# Patient Record
Sex: MALE | Race: WHITE | NOT HISPANIC OR LATINO | Employment: OTHER | URBAN - METROPOLITAN AREA
[De-identification: names, ages, dates, MRNs, and addresses within clinical notes are randomized per-mention and may not be internally consistent; named-entity substitution may affect disease eponyms.]

---

## 2017-03-08 ENCOUNTER — OFFICE VISIT (OUTPATIENT)
Dept: CARDIOLOGY | Facility: CLINIC | Age: 72
End: 2017-03-08

## 2017-03-08 VITALS
HEIGHT: 67 IN | HEART RATE: 90 BPM | DIASTOLIC BLOOD PRESSURE: 88 MMHG | SYSTOLIC BLOOD PRESSURE: 144 MMHG | WEIGHT: 186.2 LBS | BODY MASS INDEX: 29.22 KG/M2

## 2017-03-08 DIAGNOSIS — R00.1 BRADYCARDIA: ICD-10-CM

## 2017-03-08 DIAGNOSIS — I47.1 SVT (SUPRAVENTRICULAR TACHYCARDIA) (HCC): Primary | ICD-10-CM

## 2017-03-08 DIAGNOSIS — D50.0 IRON DEFICIENCY ANEMIA DUE TO CHRONIC BLOOD LOSS: ICD-10-CM

## 2017-03-08 PROCEDURE — 99213 OFFICE O/P EST LOW 20 MIN: CPT | Performed by: INTERNAL MEDICINE

## 2017-03-08 PROCEDURE — 93288 INTERROG EVL PM/LDLS PM IP: CPT | Performed by: INTERNAL MEDICINE

## 2017-03-08 RX ORDER — FERROUS SULFATE 325(65) MG
325 TABLET ORAL
COMMUNITY
End: 2019-07-24

## 2017-03-08 NOTE — PROGRESS NOTES
Tobias Still  1945  [phone]  [phone]    03/08/2017    [facility] CARDIOLOGY     Kayode Aldrich MD  [address] [place] [id number]    Chief Complaint   Patient presents with   • Atrial Fibrillation   • Slow Heart Rate       Problem List:   PROBLEM LIST:  1. Complete heart block/bradycardia:  a. Implantation of a dual-chamber permanent pacemaker (Medtronic), 01/19/2009.  2. Atrial fibrillation/MAT/atrial flutter:  a. Onset in 1997, treated with Tambocor, which was changed to amiodarone, Rythmol, then back to Tambocor therapy.  b. History of Corgard, atenolol, Sectral, verapamil, and Calan use, intolerant secondary to fatigue.  c. Stress echo/echo 1 year ago at [facility], no data.  d. Echocardiogram, 03/23/2010, normal LV size and function.  e. Echocardiogram, October 2014, left ventricular ejection fraction 55% to 60%, mild mitral regurgitation, mild tricuspid regurgitation.   3. Shortness of breath:  a. GXT, 06/26/2015: normal LV size and function, no ischemia.  b. Found to have significant anemia with an hematocrit of 27.  c. EGD with subsequent cauterization of an ulcer, database incomplete.  4. Chronic back pain.  5. Surgical history:  a. Left ankle.  b. Left wrist.  c. Right shoulder.  d. Right wrist.    Allergies  No Known Allergies    Current Medications    Current Outpatient Prescriptions:   •  aspirin 81 MG EC tablet, Take 81 mg by mouth daily., Disp: , Rfl:   •  digoxin (LANOXIN) 250 MCG tablet, Take 250 mcg by mouth every day., Disp: , Rfl:   •  ferrous sulfate 325 (65 FE) MG tablet, Take 325 mg by mouth 3 (Three) Times a Day With Meals., Disp: , Rfl:   •  flecainide (TAMBOCOR) 100 MG tablet, Take 100 mg by mouth 2 (two) times a day., Disp: , Rfl:   •  HYDROcodone-acetaminophen (NORCO)  MG per tablet, Take 1 tablet by mouth every 6 (six) hours as needed for moderate pain (4-6)., Disp: , Rfl:   •  omeprazole (PriLOSEC)  "20 MG capsule, Take 20 mg by mouth daily., Disp: , Rfl:   •  tamsulosin (FLOMAX) 0.4 MG capsule 24 hr capsule, Take 1 capsule by mouth every night., Disp: , Rfl:     History of Present Illness   HPI    Pt presents for follow up of atrial fibrillation, complete heart block and PM check . Since we last saw the pt, he states he felt two episodes of irregular palpitations in past few weeks. Lasted 9 hrs in duration.  Associated with fatigue and tired. BP at that time with HR of 150 and /70. No SOB at rest. No CP, LH, and dizziness. No syncope. Denies any hospitalizations, ER visits, bleeding, or TIA/CVA symptoms. Overall feels well.    ROS:  General:  + fatigue, No weight gain or loss  Cardiovascular:  Denies CP, PND, syncope, near syncope, edema   Pulmonary:  Mild MOSES, cough, or wheezing      Vitals:    03/08/17 1037   BP: 144/88   BP Location: Left arm   Patient Position: Sitting   Pulse: 90   Weight: 186 lb 3.2 oz (84.5 kg)   Height: 67\" (170.2 cm)     PE:  NAD  Neck: no JVD, no carotid bruits, no TM  Heart RRR, NL S1, S2, S4 present, no rubs, murmurs  Lungs: CTA  Abd: soft, non-tender, NL BS  Ext: No musculoskeletal deformities    PM interrogation: NL PM fxn 9 months left to KRISTIN Probably SVT at 150 bpm 0.1% of the time, longest 38 min    Diagnostic Data:  Procedures    1. SVT (supraventricular tachycardia)    2. Bradycardia          Plan:  1. SVT/AFL- overall well controlled on flecainide except for recent symptoms: Monitor for now, can take extra flecainide prn  2. Bradycardia s/p PPM with normal function  3. Anticoagulation: CHADSvasc =   On ASA, no AA due to history of severe anemia; pt does not want HCT today    F/up in 6 months    Scribed for Donn Newell MD by Jerrica Hernandez PA-C. 3/8/2017  11:19 AM     IDonn MD, personally performed the services described in this documentation as scribed by the above named individual in my presence, and it is both accurate and complete.  3/8/2017  " 11:21 AM

## 2017-11-21 ENCOUNTER — OFFICE VISIT (OUTPATIENT)
Dept: CARDIOLOGY | Facility: CLINIC | Age: 72
End: 2017-11-21

## 2017-11-21 VITALS
SYSTOLIC BLOOD PRESSURE: 128 MMHG | HEIGHT: 67 IN | DIASTOLIC BLOOD PRESSURE: 60 MMHG | HEART RATE: 65 BPM | WEIGHT: 172.8 LBS | BODY MASS INDEX: 27.12 KG/M2

## 2017-11-21 DIAGNOSIS — I44.2 COMPLETE HEART BLOCK (HCC): Primary | ICD-10-CM

## 2017-11-21 DIAGNOSIS — I48.0 PAROXYSMAL ATRIAL FIBRILLATION (HCC): ICD-10-CM

## 2017-11-21 PROCEDURE — 93288 INTERROG EVL PM/LDLS PM IP: CPT | Performed by: PHYSICIAN ASSISTANT

## 2017-11-21 PROCEDURE — 99213 OFFICE O/P EST LOW 20 MIN: CPT | Performed by: PHYSICIAN ASSISTANT

## 2017-11-30 ENCOUNTER — PREP FOR SURGERY (OUTPATIENT)
Dept: OTHER | Facility: HOSPITAL | Age: 72
End: 2017-11-30

## 2017-11-30 DIAGNOSIS — I48.0 PAROXYSMAL ATRIAL FIBRILLATION (HCC): ICD-10-CM

## 2017-11-30 DIAGNOSIS — I44.2 COMPLETE HEART BLOCK (HCC): Primary | ICD-10-CM

## 2017-11-30 RX ORDER — NITROGLYCERIN 0.4 MG/1
0.4 TABLET SUBLINGUAL
Status: CANCELLED | OUTPATIENT
Start: 2017-11-30

## 2017-11-30 RX ORDER — VANCOMYCIN/0.9 % SOD CHLORIDE 1.5G/250ML
20 PLASTIC BAG, INJECTION (ML) INTRAVENOUS
Status: CANCELLED | OUTPATIENT
Start: 2017-12-01 | End: 2017-12-02

## 2017-11-30 RX ORDER — PROMETHAZINE HYDROCHLORIDE 25 MG/ML
12.5 INJECTION, SOLUTION INTRAMUSCULAR; INTRAVENOUS EVERY 4 HOURS PRN
Status: CANCELLED | OUTPATIENT
Start: 2017-11-30

## 2017-11-30 RX ORDER — SODIUM CHLORIDE 0.9 % (FLUSH) 0.9 %
1-10 SYRINGE (ML) INJECTION AS NEEDED
Status: CANCELLED | OUTPATIENT
Start: 2017-11-30

## 2017-11-30 RX ORDER — ACETAMINOPHEN 325 MG/1
650 TABLET ORAL EVERY 4 HOURS PRN
Status: CANCELLED | OUTPATIENT
Start: 2017-11-30

## 2017-12-10 ENCOUNTER — APPOINTMENT (OUTPATIENT)
Dept: PREADMISSION TESTING | Facility: HOSPITAL | Age: 72
End: 2017-12-10

## 2017-12-10 DIAGNOSIS — I44.2 COMPLETE HEART BLOCK (HCC): ICD-10-CM

## 2017-12-10 DIAGNOSIS — I48.0 PAROXYSMAL ATRIAL FIBRILLATION (HCC): ICD-10-CM

## 2017-12-10 LAB
ANION GAP SERPL CALCULATED.3IONS-SCNC: 7 MMOL/L (ref 3–11)
BUN BLD-MCNC: 14 MG/DL (ref 9–23)
BUN/CREAT SERPL: 15.6 (ref 7–25)
CALCIUM SPEC-SCNC: 9.1 MG/DL (ref 8.7–10.4)
CHLORIDE SERPL-SCNC: 105 MMOL/L (ref 99–109)
CO2 SERPL-SCNC: 30 MMOL/L (ref 20–31)
CREAT BLD-MCNC: 0.9 MG/DL (ref 0.6–1.3)
DEPRECATED RDW RBC AUTO: 41.7 FL (ref 37–54)
ERYTHROCYTE [DISTWIDTH] IN BLOOD BY AUTOMATED COUNT: 12.5 % (ref 11.3–14.5)
GFR SERPL CREATININE-BSD FRML MDRD: 83 ML/MIN/1.73
GLUCOSE BLD-MCNC: 61 MG/DL (ref 70–100)
HBA1C MFR BLD: 6 % (ref 4.8–5.6)
HCT VFR BLD AUTO: 45.2 % (ref 38.9–50.9)
HGB BLD-MCNC: 14.9 G/DL (ref 13.1–17.5)
INR PPP: 1.1
MCH RBC QN AUTO: 30.1 PG (ref 27–31)
MCHC RBC AUTO-ENTMCNC: 33 G/DL (ref 32–36)
MCV RBC AUTO: 91.3 FL (ref 80–99)
PLATELET # BLD AUTO: 245 10*3/MM3 (ref 150–450)
PMV BLD AUTO: 10.4 FL (ref 6–12)
POTASSIUM BLD-SCNC: 4.1 MMOL/L (ref 3.5–5.5)
PROTHROMBIN TIME: 12 SECONDS (ref 9.6–11.5)
RBC # BLD AUTO: 4.95 10*6/MM3 (ref 4.2–5.76)
SODIUM BLD-SCNC: 142 MMOL/L (ref 132–146)
WBC NRBC COR # BLD: 7.18 10*3/MM3 (ref 3.5–10.8)

## 2017-12-10 PROCEDURE — 85027 COMPLETE CBC AUTOMATED: CPT | Performed by: PHYSICIAN ASSISTANT

## 2017-12-10 PROCEDURE — 80048 BASIC METABOLIC PNL TOTAL CA: CPT | Performed by: PHYSICIAN ASSISTANT

## 2017-12-10 PROCEDURE — 85610 PROTHROMBIN TIME: CPT | Performed by: PHYSICIAN ASSISTANT

## 2017-12-10 PROCEDURE — 36415 COLL VENOUS BLD VENIPUNCTURE: CPT

## 2017-12-10 PROCEDURE — 83036 HEMOGLOBIN GLYCOSYLATED A1C: CPT | Performed by: PHYSICIAN ASSISTANT

## 2017-12-10 RX ORDER — DIGOXIN 125 MCG
125 TABLET ORAL
COMMUNITY
End: 2018-08-14 | Stop reason: HOSPADM

## 2017-12-10 NOTE — DISCHARGE INSTRUCTIONS
The following instructions given during Pre Admission Testing visit:    Do not eat or drink anything after MN except for sips of water with your a.m. Prescription meds unless otherwise instructed by your physician.    Glasses and jewelry may be worn, but dentures must be removed prior to cath/procedure.    Leave any items you consider valuable at home.    Family members may wait in [facility] waiting area during procedure.    Bring all medications in their original containers the day of procedure.    Bring photo ID and insurance cards on the day of procedure.    Need to make arrangements for transportation prior to discharge.    The following handouts were given:     Heart Cath pathway (if applicable)   Cardiac Cath booklet published by [facility]    OR appropriate [facility] procedure booklet    If applicable, pt instructed to bring CPAP mask and tubing the day of procedure.  Patient to apply to surgical area (as instructed) the night before procedure and the AM of procedure.

## 2017-12-11 ENCOUNTER — HOSPITAL ENCOUNTER (OUTPATIENT)
Facility: HOSPITAL | Age: 72
Discharge: HOME | End: 2017-12-11
Attending: INTERNAL MEDICINE | Admitting: INTERNAL MEDICINE

## 2017-12-11 VITALS
WEIGHT: 173.35 LBS | DIASTOLIC BLOOD PRESSURE: 75 MMHG | HEART RATE: 70 BPM | BODY MASS INDEX: 27.21 KG/M2 | RESPIRATION RATE: 9 BRPM | TEMPERATURE: 97.8 F | OXYGEN SATURATION: 93 % | SYSTOLIC BLOOD PRESSURE: 127 MMHG | HEIGHT: 67 IN

## 2017-12-11 PROBLEM — R00.1 BRADYCARDIA: Status: ACTIVE | Noted: 2017-12-11

## 2017-12-11 PROCEDURE — 25010000002 ONDANSETRON PER 1 MG: Performed by: INTERNAL MEDICINE

## 2017-12-11 PROCEDURE — 33228 REMV&REPLC PM GEN DUAL LEAD: CPT | Performed by: INTERNAL MEDICINE

## 2017-12-11 PROCEDURE — 25010000002 VANCOMYCIN HCL IN NACL 1.5-0.9 GM/500ML-% SOLUTION: Performed by: PHYSICIAN ASSISTANT

## 2017-12-11 PROCEDURE — 25010000002 MIDAZOLAM PER 1 MG: Performed by: INTERNAL MEDICINE

## 2017-12-11 PROCEDURE — C1785 PMKR, DUAL, RATE-RESP: HCPCS | Performed by: INTERNAL MEDICINE

## 2017-12-11 PROCEDURE — 25010000002 FENTANYL CITRATE (PF) 100 MCG/2ML SOLUTION: Performed by: INTERNAL MEDICINE

## 2017-12-11 DEVICE — PACEMAKER
Type: IMPLANTABLE DEVICE | Status: FUNCTIONAL
Brand: ESSENTIO™ DR

## 2017-12-11 RX ORDER — FENTANYL CITRATE 50 UG/ML
INJECTION, SOLUTION INTRAMUSCULAR; INTRAVENOUS AS NEEDED
Status: DISCONTINUED | OUTPATIENT
Start: 2017-12-11 | End: 2017-12-11 | Stop reason: HOSPADM

## 2017-12-11 RX ORDER — VANCOMYCIN/0.9 % SOD CHLORIDE 1.5G/250ML
20 PLASTIC BAG, INJECTION (ML) INTRAVENOUS
Status: COMPLETED | OUTPATIENT
Start: 2017-12-11 | End: 2017-12-11

## 2017-12-11 RX ORDER — ACETAMINOPHEN 325 MG/1
650 TABLET ORAL EVERY 4 HOURS PRN
Status: DISCONTINUED | OUTPATIENT
Start: 2017-12-11 | End: 2017-12-11 | Stop reason: HOSPADM

## 2017-12-11 RX ORDER — HYDROCODONE BITARTRATE AND ACETAMINOPHEN 5; 325 MG/1; MG/1
1 TABLET ORAL EVERY 4 HOURS PRN
Status: DISCONTINUED | OUTPATIENT
Start: 2017-12-11 | End: 2017-12-11 | Stop reason: HOSPADM

## 2017-12-11 RX ORDER — ONDANSETRON 2 MG/ML
4 INJECTION INTRAMUSCULAR; INTRAVENOUS EVERY 6 HOURS PRN
Status: DISCONTINUED | OUTPATIENT
Start: 2017-12-11 | End: 2017-12-11 | Stop reason: HOSPADM

## 2017-12-11 RX ORDER — SODIUM CHLORIDE 0.9 % (FLUSH) 0.9 %
1-10 SYRINGE (ML) INJECTION AS NEEDED
Status: DISCONTINUED | OUTPATIENT
Start: 2017-12-11 | End: 2017-12-11 | Stop reason: HOSPADM

## 2017-12-11 RX ORDER — LIDOCAINE HYDROCHLORIDE 10 MG/ML
INJECTION, SOLUTION INFILTRATION; PERINEURAL AS NEEDED
Status: DISCONTINUED | OUTPATIENT
Start: 2017-12-11 | End: 2017-12-11 | Stop reason: HOSPADM

## 2017-12-11 RX ORDER — PROMETHAZINE HYDROCHLORIDE 25 MG/ML
12.5 INJECTION, SOLUTION INTRAMUSCULAR; INTRAVENOUS EVERY 4 HOURS PRN
Status: DISCONTINUED | OUTPATIENT
Start: 2017-12-11 | End: 2017-12-11 | Stop reason: HOSPADM

## 2017-12-11 RX ORDER — ONDANSETRON 2 MG/ML
INJECTION INTRAMUSCULAR; INTRAVENOUS AS NEEDED
Status: DISCONTINUED | OUTPATIENT
Start: 2017-12-11 | End: 2017-12-11 | Stop reason: HOSPADM

## 2017-12-11 RX ORDER — BUPIVACAINE HYDROCHLORIDE 5 MG/ML
INJECTION, SOLUTION PERINEURAL AS NEEDED
Status: DISCONTINUED | OUTPATIENT
Start: 2017-12-11 | End: 2017-12-11 | Stop reason: HOSPADM

## 2017-12-11 RX ORDER — MIDAZOLAM HYDROCHLORIDE 1 MG/ML
INJECTION INTRAMUSCULAR; INTRAVENOUS AS NEEDED
Status: DISCONTINUED | OUTPATIENT
Start: 2017-12-11 | End: 2017-12-11 | Stop reason: HOSPADM

## 2017-12-11 RX ORDER — SODIUM CHLORIDE 9 MG/ML
INJECTION, SOLUTION INTRAVENOUS CONTINUOUS PRN
Status: DISCONTINUED | OUTPATIENT
Start: 2017-12-11 | End: 2017-12-11 | Stop reason: HOSPADM

## 2017-12-11 RX ORDER — NITROGLYCERIN 0.4 MG/1
0.4 TABLET SUBLINGUAL
Status: DISCONTINUED | OUTPATIENT
Start: 2017-12-11 | End: 2017-12-11 | Stop reason: HOSPADM

## 2017-12-11 RX ADMIN — VANCOMYCIN HCL-SODIUM CHLORIDE IV SOLN 1.5 GM/250ML-0.9% 1500 MG: 1.5-0.9/25 SOLUTION at 09:13

## 2017-12-11 NOTE — INTERVAL H&P NOTE
H&P reviewed. The patient was examined and there are no changes to the H&P.  Labs reviewed.  Physical exam completed.  He denies any fevers, chills, recent infections.  No changes to medications.  Device was noted to be at KRISTIN and will undergo generator change today with Dr. Newell.  The risks, benefits, and alternatives of the procedure have been reviewed and the patient wishes to proceed.     SHANNON Pan  [facility]  12/11/2017  7:14 AM      I, Donn Newell MD, personally performed the services face to face as described and documented by the above named individual. I have made any necessary edits and it is both accurate and complete 12/11/2017  9:03 AM

## 2017-12-11 NOTE — PLAN OF CARE
Problem: Patient Care Overview (Adult)  Goal: Plan of Care Review  Outcome: Ongoing (interventions implemented as appropriate)    12/11/17 0636   Coping/Psychosocial Response Interventions   Plan Of Care Reviewed With patient   Patient Care Overview   Progress no change   Outcome Evaluation   Outcome Summary/Follow up Plan awaiting ppm gen change

## 2017-12-11 NOTE — H&P (VIEW-ONLY)
Subjective:   Tobias Still  1945  [phone]  [phone]    11/21/2017    [facility] CARDIOLOGY    Kayode Aldrich MD  [facility] [address] [place] [address]        Patient ID: Tobias Still is a 71 y.o. male.    Chief Complaint:   Chief Complaint   Patient presents with   • Atrial Fibrillation   • Shortness of Breath       PROBLEM LIST:  1. Complete heart block/bradycardia:  a. Implantation of a dual-chamber permanent pacemaker (Medtronic), 01/19/2009.  2. Atrial fibrillation/MAT/atrial flutter:  a. Onset in 1997, treated with Tambocor, which was changed to amiodarone, Rythmol, then back to Tambocor therapy.  b. History of Corgard, atenolol, Sectral, verapamil, and Calan use, intolerant secondary to fatigue.  c. Stress echo/echo 1 year ago at [facility], no data.  d. Echocardiogram, 03/23/2010, normal LV size and function.  e. Echocardiogram, October 2014, left ventricular ejection fraction 55% to 60%, mild mitral regurgitation, mild tricuspid regurgitation.   f. Chadsvasc=1  3. Shortness of breath:  a. GXT, 06/26/2015: normal LV size and function, no ischemia.  b. Found to have significant anemia with an hematocrit of 27.  c. EGD with subsequent cauterization of an ulcer, database incomplete.  4. Chronic back pain.  5. Surgical history:  a. Left ankle.  b. Left wrist.  c. Right shoulder.  d. Right wrist.     Allergies  No Known Allergies    Current Outpatient Prescriptions:   •  aspirin 81 MG EC tablet, Take 81 mg by mouth daily., Disp: , Rfl:   •  digoxin (LANOXIN) 250 MCG tablet, Take 250 mcg by mouth every day., Disp: , Rfl:   •  ferrous sulfate 325 (65 FE) MG tablet, Take 325 mg by mouth 3 (Three) Times a Day With Meals., Disp: , Rfl:   •  flecainide (TAMBOCOR) 100 MG tablet, Take 100 mg by mouth 2 (two) times a day., Disp: , Rfl:   •  HYDROcodone-acetaminophen (NORCO)  MG per tablet, Take 1 tablet by mouth every 6 (six) hours as  "needed for moderate pain (4-6)., Disp: , Rfl:   •  omeprazole (PriLOSEC) 20 MG capsule, Take 20 mg by mouth daily., Disp: , Rfl:   •  tamsulosin (FLOMAX) 0.4 MG capsule 24 hr capsule, Take 1 capsule by mouth every night., Disp: , Rfl:     History of Present Illness  The patient is a pleasant 71-year-old gentleman presents today for follow-up regarding history of atrial fibrillation, palpitations, bradycardia.  He reports no change in his overall health since last visit with our office.  He denies any sustained palpitations.  He denies any chest pain.  He denies any dizziness near-syncope or syncope events.  He is not having lab work for some time and he does have a known history of chronic anemia but is taking his iron therapy.        The following portions of the patient's history were reviewed and updated as appropriate: allergies, current medications, past family history, past medical history, past social history, past surgical history and problem list.    ROS   14 point ROS negative except as outlined in problem list, HPI and other parts of the note.    Procedures       Objective:       Vitals:    11/21/17 1033   BP: 128/60   BP Location: Right arm   Patient Position: Sitting   Pulse: 65   Weight: 172 lb 12.8 oz (78.4 kg)   Height: 67\" (170.2 cm)       GENERAL: Well-developed, well-nourished patient in no acute distress.  HEENT: Normocephalic, atraumatic,   NECK: No JVD present at 30°. No carotid bruits auscultated.  LUNGS: Clear to auscultation.  CARDIOVASCULAR: Heart has a regular rate and rhythm. No murmurs, gallops or rubs noted.   SKIN: Pink, warm  Neuro: Nonfocal exam. Gait intact  Ext: No edema or bruising    The patient's old records including ambulatory rhythm recordings (ECGs, Holter/event monitor) were reviewed and discussed.      Lab Review:   Results for orders placed or performed during the hospital encounter of 07/15/15   Hemoglobin A1c   Result Value Ref Range    Hemoglobin A1C 6.3 (H) 4.00 - " 6.00 %    Mean Bld Glu Estim. 129 mg/dL   CBC (No diff)   Result Value Ref Range    WBC 7.19 3.50 - 10.80 K/mcL    RBC 3.99 (L) 4.20 - 5.76 M/mcL    Hemoglobin 8.0 (L) 13.1 - 17.5 g/dL    Hematocrit 28.3 (L) 38.9 - 50.9 %    MCV 70.9 (L) 80.0 - 99.0 fL    MCH 20.1 (L) 27.0 - 31.0 pg    MCHC 28.3 (L) 32.0 - 36.0 g/dL    RDW-CV 15.8 (H) 11.3 - 14.5 %    Platelets 342 150 - 450 K/mcL   Basic metabolic panel   Result Value Ref Range    Glucose 102 (H) 70 - 100 mg/dL    BUN 12 9 - 23 mg/dL    Creatinine 0.8 0.6 - 1.3 mg/dL    Sodium 142 132 - 146 mmol/L    Potassium 4.6 3.5 - 5.5 mmol/L    Chloride 106 99 - 109 mmol/L    CO2 27 20 - 31 mmol/L    Calcium 9.1 8.7 - 10.4 mg/dL    eGFR 96 ml/min/1.732    Anion Gap 9 3 - 11 mmol/L   Lipid panel   Result Value Ref Range    Total Cholesterol 150 0 - 200 mg/dL    Triglycerides 129 0 - 150 mg/dL    HDL Cholesterol 34 (L) 40 - 60 mg/dL    LDL Cholesterol  105 0 - 130 mg/dL             Device Interrogation: Dual-chamber Medtronic device a paced.  RV paced 3%.  R-wave 5.6 mV.  Threshold 0.7 V at 0 perform oh seconds.  Impedance 507 ohms.  Device KRISTIN on 09/22/2017.    Diagnosis:   1. PAF:  Tambocor therapy, No recurrent Afib. Chadsvasc=1. ASA daily.   2. Bradycardia: Medtronic Pacemaker KRISTIN 9/17.  3. Chronic Anemia  3. Negative MPS 2015    Assessment & Plan:   We discussed pursuing pacemaker generator change at this time.  The risk and benefits explaineddetail to the patient he would like to proceed.  He will continue current medical therapy and further records will made following device generator change.    DAGOBERTO Gary  11/21/17  11:03 AM

## 2017-12-11 NOTE — PLAN OF CARE
Problem: Patient Care Overview (Adult)  Goal: Plan of Care Review  Outcome: Outcome(s) achieved Date Met:  12/11/17 12/11/17 1121   Coping/Psychosocial Response Interventions   Plan Of Care Reviewed With patient   Patient Care Overview   Progress no change   Outcome Evaluation   Outcome Summary/Follow up Plan Patient's site stable at time of discharge. The patient is being discharged home with family.       Goal: Adult Individualization and Mutuality  Outcome: Outcome(s) achieved Date Met:  12/11/17  Goal: Discharge Needs Assessment  Outcome: Outcome(s) achieved Date Met:  12/11/17    Problem: Cardiac Catheterization with/without PCI (Adult)  Goal: Signs and Symptoms of Listed Potential Problems Will be Absent or Manageable (Cardiac Catheterization with/without PCI)  Outcome: Outcome(s) achieved Date Met:  12/11/17

## 2017-12-19 ENCOUNTER — OFFICE VISIT (OUTPATIENT)
Dept: CARDIOLOGY | Facility: CLINIC | Age: 72
End: 2017-12-19

## 2017-12-19 DIAGNOSIS — I48.0 PAROXYSMAL ATRIAL FIBRILLATION (HCC): Primary | ICD-10-CM

## 2017-12-19 PROCEDURE — 99024 POSTOP FOLLOW-UP VISIT: CPT | Performed by: INTERNAL MEDICINE

## 2018-01-11 ENCOUNTER — TELEPHONE (OUTPATIENT)
Dept: CARDIOLOGY | Facility: CLINIC | Age: 73
End: 2018-01-11

## 2018-01-11 NOTE — TELEPHONE ENCOUNTER
Received first Latitude home monitoring reading and pt had fast heart rates on 1/9/18.  Called pt to see if he was aware.  Pt was aware. Showed Dr Newell the episode.  In past Dr Newell has told him to take an extra Flecainide.  He said he hasn't done that in a long time.  Encouraged him to do so if he has this.  Going to schedule another remote reading in February to follow back up with heart rates.  Told pt to call if he has issues.

## 2018-02-27 ENCOUNTER — TELEPHONE (OUTPATIENT)
Dept: CARDIOLOGY | Facility: CLINIC | Age: 73
End: 2018-02-27

## 2018-02-27 NOTE — TELEPHONE ENCOUNTER
Called to check in with pt regarding his Latitude home monitoring reading.  Pt had some brief fast heart rates.  Spoke to pt and his is doing good.  Pt has f/u appt with Dr Newell on 3/28/18.  Told pt to call if he has in problems. Pt verbalized understanding.

## 2018-03-20 ENCOUNTER — CLINICAL SUPPORT NO REQUIREMENTS (OUTPATIENT)
Dept: CARDIOLOGY | Facility: CLINIC | Age: 73
End: 2018-03-20

## 2018-03-20 DIAGNOSIS — I48.0 PAROXYSMAL ATRIAL FIBRILLATION (HCC): Primary | ICD-10-CM

## 2018-03-22 ENCOUNTER — APPOINTMENT (OUTPATIENT)
Dept: LAB | Facility: HOSPITAL | Age: 73
End: 2018-03-22

## 2018-03-22 ENCOUNTER — OFFICE VISIT (OUTPATIENT)
Dept: CARDIOLOGY | Facility: CLINIC | Age: 73
End: 2018-03-22

## 2018-03-22 VITALS
DIASTOLIC BLOOD PRESSURE: 64 MMHG | BODY MASS INDEX: 26.58 KG/M2 | HEIGHT: 68 IN | SYSTOLIC BLOOD PRESSURE: 120 MMHG | WEIGHT: 175.4 LBS | HEART RATE: 136 BPM

## 2018-03-22 DIAGNOSIS — I48.0 PAROXYSMAL ATRIAL FIBRILLATION (HCC): ICD-10-CM

## 2018-03-22 DIAGNOSIS — I48.3 TYPICAL ATRIAL FLUTTER (HCC): Primary | ICD-10-CM

## 2018-03-22 DIAGNOSIS — I44.2 COMPLETE HEART BLOCK (HCC): ICD-10-CM

## 2018-03-22 PROBLEM — I48.92 ATRIAL FLUTTER: Status: ACTIVE | Noted: 2018-03-22

## 2018-03-22 LAB
DEPRECATED RDW RBC AUTO: 41.5 FL (ref 37–54)
ERYTHROCYTE [DISTWIDTH] IN BLOOD BY AUTOMATED COUNT: 12.6 % (ref 11.3–14.5)
HCT VFR BLD AUTO: 45.6 % (ref 38.9–50.9)
HGB BLD-MCNC: 15.2 G/DL (ref 13.1–17.5)
MCH RBC QN AUTO: 30.1 PG (ref 27–31)
MCHC RBC AUTO-ENTMCNC: 33.3 G/DL (ref 32–36)
MCV RBC AUTO: 90.3 FL (ref 80–99)
PLATELET # BLD AUTO: 279 10*3/MM3 (ref 150–450)
PMV BLD AUTO: 11.1 FL (ref 6–12)
RBC # BLD AUTO: 5.05 10*6/MM3 (ref 4.2–5.76)
WBC NRBC COR # BLD: 6.8 10*3/MM3 (ref 3.5–10.8)

## 2018-03-22 PROCEDURE — 93281 PM DEVICE PROGR EVAL MULTI: CPT | Performed by: PHYSICIAN ASSISTANT

## 2018-03-22 PROCEDURE — 36415 COLL VENOUS BLD VENIPUNCTURE: CPT | Performed by: PHYSICIAN ASSISTANT

## 2018-03-22 PROCEDURE — 85027 COMPLETE CBC AUTOMATED: CPT | Performed by: PHYSICIAN ASSISTANT

## 2018-03-22 PROCEDURE — 99213 OFFICE O/P EST LOW 20 MIN: CPT | Performed by: PHYSICIAN ASSISTANT

## 2018-03-22 NOTE — PROGRESS NOTES
Tobias Still  1945  [phone]      03/22/2018    [facility] CARDIOLOGY     Kayode Aldrich MD  [facility] [address] [place] [id number]    Chief Complaint   Patient presents with   • Rapid Heart Rate   • Complete heart block         PROBLEM LIST:  1. Complete heart block/bradycardia:  a. Implantation of a dual-chamber permanent pacemaker (Medtronic), 01/19/2009.  2. Atrial fibrillation/MAT/atrial flutter:  a. Onset in 1997, treated with Tambocor, which was changed to amiodarone, Rythmol, then back to Tambocor therapy.  b. History of Corgard, atenolol, Sectral, verapamil, and Calan use, intolerant secondary to fatigue.  c. Stress echo/echo 1 year ago at [facility], no data.  d. Echocardiogram, 03/23/2010, normal LV size and function.  e. Echocardiogram, October 2014, left ventricular ejection fraction 55% to 60%, mild mitral regurgitation, mild tricuspid regurgitation.   f. Chadsvasc=1  3. Shortness of breath:  a. GXT, 06/26/2015: normal LV size and function, no ischemia.  b. Found to have significant anemia with an hematocrit of 27.  c. EGD with subsequent cauterization of an ulcer, database incomplete.  4. Chronic back pain.  5. Surgical history:  a. Left ankle.  b. Left wrist.  c. Right shoulder.  d. Right wrist.    Allergies  No Known Allergies    Current Medications    Current Outpatient Prescriptions:   •  aspirin 81 MG EC tablet, Take 81 mg by mouth daily., Disp: , Rfl:   •  digoxin (LANOXIN) 125 MCG tablet, Take 125 mcg by mouth Daily., Disp: , Rfl:   •  ferrous sulfate 325 (65 FE) MG tablet, Take 325 mg by mouth 3 (Three) Times a Day With Meals., Disp: , Rfl:   •  flecainide (TAMBOCOR) 100 MG tablet, Take 100 mg by mouth 2 (two) times a day., Disp: , Rfl:   •  HYDROcodone-acetaminophen (NORCO)  MG per tablet, Take 1 tablet by mouth Every 8 (Eight) Hours As Needed for Moderate Pain ., Disp: , Rfl:   •  Ibuprofen-Diphenhydramine Cit  "200-38 MG tablet, Take 1 tablet by mouth Every Night., Disp: , Rfl:   •  omeprazole (PriLOSEC) 20 MG capsule, Take 20 mg by mouth daily., Disp: , Rfl:   •  tamsulosin (FLOMAX) 0.4 MG capsule 24 hr capsule, Take 1 capsule by mouth every night., Disp: , Rfl:     History of Present Illness   HPI    Pt presents for early follow up per request of Dr. Newell. Our nurse, Sania, picked up on his home monitor that he had been in atrial flutter since 3/18/18 with rapid ventricular rates. He has noticed for the past few days feeling an \"uneasiness in his chest\", some mild SOB, and mild lightheadedness, but no presyncope, syncope, or CP.  Prior to this event, he had been feeling in his normal state of health with no issues. He does note that his PCP gave him steroid shot last week due to a rash on his arms. He denies caffeine and ETOH. He denies any CVA/TIA like symptoms.     ROS:  General:  Denies fatigue, weight gain or loss  Cardiovascular:  Denies CP, PND, syncope, near syncope, edema + palpitations.  Pulmonary:  + MOSES, -+cough, - wheezing      Vitals:    03/22/18 1353   BP: 120/64   BP Location: Right arm   Patient Position: Sitting   Pulse: (!) 136   Weight: 79.6 kg (175 lb 6.4 oz)   Height: 171.5 cm (67.5\")     Body mass index is 27.07 kg/m².  PE:  General: NAD. A & O x 3  Neck: no JVD, no carotid bruits, no TM  Heart RRR, tachycardic, NL S1, S2,  no rubs, murmurs  Lungs: CTAB, no wheezes, rhonchi, or rales  Abd: soft, non-tender, NL BS  Ext: No musculoskeletal deformities, no edema, cyanosis, or clubbing  Psych: normal mood and affect    Diagnostic Data:        ECG 12 Lead  Date/Time: 3/22/2018 2:29 PM  Performed by: ABHI BRADLEY  Authorized by: ABHI BRADLEY   Rhythm: atrial flutter  BPM: 150           PM Check: normal function, currently in atrial flutter has been in since 3/18/18. 8 years on battery.     1. Typical atrial flutter    2. Paroxysmal atrial fibrillation    3. Complete heart block  "         Plan:  1) Typical Atrial flutter - persistent with rapid rates for the past 4 days, possibly precipitated by steroid injection last week. We will add Metoprolol 25 mg BID for rate control, and recheck him on home monitoring over the next week. If he does not convert, will set up for atrial flutter ablation in the next three to four weeks once he is anticoagulated appropriately. He will need to stop Flecainide 5 days prior to flutter ablation if pursued.   2) Anticoagulation: CHADSVasc = 1. He has had issues with anemia in the past. We will check a CBC today and Dr. Newell will call the patient with results. Samples given of Eliquis today 5 mg BID..   3) Atrial Fibrillation - continue Flecainide for now.    Start Eliquis 5 mg BID if H& H are ok per Dr. Newell.  4) CHB - normal PM function      F/up in 6 months  This case was discussed with Dr. Newell, but I saw the patient independently.   [facility]  3/22/2018   2:51 PM

## 2018-03-26 ENCOUNTER — CLINICAL SUPPORT NO REQUIREMENTS (OUTPATIENT)
Dept: CARDIOLOGY | Facility: CLINIC | Age: 73
End: 2018-03-26

## 2018-03-26 DIAGNOSIS — I48.3 TYPICAL ATRIAL FLUTTER (HCC): Primary | ICD-10-CM

## 2018-04-09 ENCOUNTER — DOCUMENTATION (OUTPATIENT)
Dept: CARDIOLOGY | Facility: CLINIC | Age: 73
End: 2018-04-09

## 2018-04-09 ENCOUNTER — TELEPHONE (OUTPATIENT)
Dept: CARDIOLOGY | Facility: CLINIC | Age: 73
End: 2018-04-09

## 2018-04-09 NOTE — TELEPHONE ENCOUNTER
Mr Still converted to NSR yesterday.        ----- Message from Donn Newell MD sent at 4/9/2018  3:06 PM EDT -----  Please download PM rhythm to see if pt still in AFL

## 2018-06-11 ENCOUNTER — CLINICAL SUPPORT NO REQUIREMENTS (OUTPATIENT)
Dept: CARDIOLOGY | Facility: CLINIC | Age: 73
End: 2018-06-11

## 2018-06-11 DIAGNOSIS — I44.2 COMPLETE HEART BLOCK (HCC): ICD-10-CM

## 2018-06-11 PROCEDURE — 93294 REM INTERROG EVL PM/LDLS PM: CPT | Performed by: INTERNAL MEDICINE

## 2018-06-11 PROCEDURE — 93296 REM INTERROG EVL PM/IDS: CPT | Performed by: INTERNAL MEDICINE

## 2018-07-18 ENCOUNTER — OFFICE VISIT (OUTPATIENT)
Dept: CARDIOLOGY | Facility: CLINIC | Age: 73
End: 2018-07-18

## 2018-07-18 VITALS
WEIGHT: 170 LBS | SYSTOLIC BLOOD PRESSURE: 128 MMHG | HEART RATE: 70 BPM | BODY MASS INDEX: 26.68 KG/M2 | DIASTOLIC BLOOD PRESSURE: 70 MMHG | HEIGHT: 67 IN

## 2018-07-18 DIAGNOSIS — I44.2 COMPLETE HEART BLOCK (HCC): ICD-10-CM

## 2018-07-18 DIAGNOSIS — I48.3 TYPICAL ATRIAL FLUTTER (HCC): Primary | ICD-10-CM

## 2018-07-18 DIAGNOSIS — I47.1 ATRIAL TACHYCARDIA (HCC): ICD-10-CM

## 2018-07-18 PROCEDURE — 93280 PM DEVICE PROGR EVAL DUAL: CPT | Performed by: INTERNAL MEDICINE

## 2018-07-18 PROCEDURE — 99214 OFFICE O/P EST MOD 30 MIN: CPT | Performed by: INTERNAL MEDICINE

## 2018-07-18 NOTE — PROGRESS NOTES
Tobias Still  1945    There is no work phone number on file.    07/18/2018      [facility] CARDIOLOGY     Kayode Aldrich MD  [address]  [facility] [address]    Chief Complaint   Patient presents with   • Rapid Heart Rate   • complete heart block       Problem List:    PROBLEM LIST:  1. Complete heart block/bradycardia:  a. Implantation of a dual-chamber permanent pacemaker (Medtronic), 01/19/2009.  2. Atrial fibrillation/MAT/atrial flutter:  a. Onset in 1997, treated with Tambocor, which was changed to amiodarone, Rythmol, then back to Tambocor therapy.  b. History of Corgard, atenolol, Sectral, verapamil, and Calan use, intolerant secondary to fatigue.  c. Stress echo/echo 1 year ago at [facility], no data.  d. Echocardiogram, 03/23/2010, normal LV size and function.  e. Echocardiogram, October 2014, left ventricular ejection fraction 55% to 60%, mild mitral regurgitation, mild tricuspid regurgitation.   f. Chadsvasc=1  3. Shortness of breath:  a. GXT, 06/26/2015: normal LV size and function, no ischemia.  b. Found to have significant anemia with an hematocrit of 27.  c. EGD with subsequent cauterization of an ulcer, database incomplete.  4. Chronic back pain.  5. Surgical history:  a. Left ankle.  b. Left wrist.  c. Right shoulder.  d. Right wrist.  Allergies  No Known Allergies    Current Medications    Current Outpatient Prescriptions:   •  aspirin 81 MG EC tablet, Take 81 mg by mouth daily., Disp: , Rfl:   •  digoxin (LANOXIN) 125 MCG tablet, Take 250 mcg by mouth Daily., Disp: , Rfl:   •  ferrous sulfate 325 (65 FE) MG tablet, Take 325 mg by mouth 3 (Three) Times a Day With Meals., Disp: , Rfl:   •  flecainide (TAMBOCOR) 100 MG tablet, Take 100 mg by mouth 2 (two) times a day., Disp: , Rfl:   •  HYDROcodone-acetaminophen (NORCO)  MG per tablet, Take 1 tablet by mouth Every 8 (Eight) Hours As Needed for Moderate Pain ., Disp: ,  "Rfl:   •  omeprazole (PriLOSEC) 20 MG capsule, Take 20 mg by mouth 2 (Two) Times a Day., Disp: , Rfl:   •  tamsulosin (FLOMAX) 0.4 MG capsule 24 hr capsule, Take 1 capsule by mouth every night., Disp: , Rfl:     History of Present Illness   HPI    Pt presents for follow up of CHB/AT/AFL .Since the pt has seen us in clinic last, pt was on eliquis but stopped taking med after he ran out of samples. He feels palps. He denies any syncope, SOB, CP, LH, and dizziness. Denies any hospitalizations, ER visits, bleeding, or TIA/CVA symptoms. Overall feels well.    ROS:  General:  + fatigue, No weight gain or loss  Cardiovascular:  Denies CP, PND, syncope, near syncope, edema + palpitations.  Pulmonary:  Denies MOSES, cough, or wheezing    Vitals:    07/18/18 1334   BP: 128/70   BP Location: Right arm   Patient Position: Sitting   Pulse: 70   Weight: 77.1 kg (170 lb)   Height: 170.2 cm (67\")       PE:  General: NAD  Neck: no JVD, no carotid bruits, no TM  Heart RRR, NL S1, S2, S4 present, no rubs, murmurs  Lungs: CTA, no wheezes, rhonchi, or rales  Abd: soft, non-tender, NL BS  Ext: No musculoskeletal deformities, no edema, cyanosis, or clubbing  Psych: normal mood and affect    Diagnostic Data:  Procedures      1. Typical atrial flutter (CMS/HCC)    2. Atrial tachycardia (CMS/HCC)    3. Complete heart block (CMS/HCC)        PM Interrogation: NL PM fxn, Nl battery fxn, 8% AT and AFL     Plan:  1) AT/AFL: recurrent on flecainide: needs EPS with RFA of AT/AFL: pt to decide and let us know. Hold flecainide 3 days prior  2) AVB  Continue present medications. NL pacemaker function.  3) Anticoagulation  Restart NOAC    F/up in 6 months      "

## 2018-07-19 ENCOUNTER — PREP FOR SURGERY (OUTPATIENT)
Dept: OTHER | Facility: HOSPITAL | Age: 73
End: 2018-07-19

## 2018-07-19 DIAGNOSIS — I48.3 TYPICAL ATRIAL FLUTTER (HCC): Primary | ICD-10-CM

## 2018-07-19 RX ORDER — SODIUM CHLORIDE 0.9 % (FLUSH) 0.9 %
1-10 SYRINGE (ML) INJECTION AS NEEDED
Status: CANCELLED | OUTPATIENT
Start: 2018-07-19 | End: 2019-07-19

## 2018-07-19 RX ORDER — PROMETHAZINE HYDROCHLORIDE 25 MG/ML
12.5 INJECTION, SOLUTION INTRAMUSCULAR; INTRAVENOUS EVERY 4 HOURS PRN
Status: CANCELLED | OUTPATIENT
Start: 2018-07-19 | End: 2018-08-18

## 2018-07-19 RX ORDER — ACETAMINOPHEN 325 MG/1
650 TABLET ORAL EVERY 4 HOURS PRN
Status: CANCELLED | OUTPATIENT
Start: 2018-07-19 | End: 2019-07-19

## 2018-07-19 RX ORDER — NITROGLYCERIN 0.4 MG/1
0.4 TABLET SUBLINGUAL
Status: CANCELLED | OUTPATIENT
Start: 2018-07-19 | End: 2019-07-19

## 2018-08-12 ENCOUNTER — APPOINTMENT (OUTPATIENT)
Dept: PREADMISSION TESTING | Facility: HOSPITAL | Age: 73
End: 2018-08-12

## 2018-08-12 DIAGNOSIS — I48.3 TYPICAL ATRIAL FLUTTER (HCC): ICD-10-CM

## 2018-08-12 LAB
ANION GAP SERPL CALCULATED.3IONS-SCNC: 4 MMOL/L (ref 3–11)
BUN BLD-MCNC: 12 MG/DL (ref 9–23)
BUN/CREAT SERPL: 14.3 (ref 7–25)
CALCIUM SPEC-SCNC: 9 MG/DL (ref 8.7–10.4)
CHLORIDE SERPL-SCNC: 107 MMOL/L (ref 99–109)
CO2 SERPL-SCNC: 30 MMOL/L (ref 20–31)
CREAT BLD-MCNC: 0.84 MG/DL (ref 0.6–1.3)
DEPRECATED RDW RBC AUTO: 41.1 FL (ref 37–54)
ERYTHROCYTE [DISTWIDTH] IN BLOOD BY AUTOMATED COUNT: 12.5 % (ref 11.3–14.5)
GFR SERPL CREATININE-BSD FRML MDRD: 90 ML/MIN/1.73
GLUCOSE BLD-MCNC: 113 MG/DL (ref 70–100)
HBA1C MFR BLD: 6.1 % (ref 4.8–5.6)
HCT VFR BLD AUTO: 42.1 % (ref 38.9–50.9)
HGB BLD-MCNC: 14 G/DL (ref 13.1–17.5)
INR PPP: 1.06 (ref 0.91–1.09)
MCH RBC QN AUTO: 30.2 PG (ref 27–31)
MCHC RBC AUTO-ENTMCNC: 33.3 G/DL (ref 32–36)
MCV RBC AUTO: 90.7 FL (ref 80–99)
PLATELET # BLD AUTO: 267 10*3/MM3 (ref 150–450)
PMV BLD AUTO: 10.5 FL (ref 6–12)
POTASSIUM BLD-SCNC: 3.9 MMOL/L (ref 3.5–5.5)
PROTHROMBIN TIME: 11.1 SECONDS (ref 9.6–11.5)
RBC # BLD AUTO: 4.64 10*6/MM3 (ref 4.2–5.76)
SODIUM BLD-SCNC: 141 MMOL/L (ref 132–146)
WBC NRBC COR # BLD: 5.59 10*3/MM3 (ref 3.5–10.8)

## 2018-08-12 PROCEDURE — 85027 COMPLETE CBC AUTOMATED: CPT | Performed by: NURSE PRACTITIONER

## 2018-08-12 PROCEDURE — 85610 PROTHROMBIN TIME: CPT | Performed by: NURSE PRACTITIONER

## 2018-08-12 PROCEDURE — 83036 HEMOGLOBIN GLYCOSYLATED A1C: CPT | Performed by: NURSE PRACTITIONER

## 2018-08-12 PROCEDURE — 80048 BASIC METABOLIC PNL TOTAL CA: CPT | Performed by: NURSE PRACTITIONER

## 2018-08-12 PROCEDURE — 36415 COLL VENOUS BLD VENIPUNCTURE: CPT

## 2018-08-12 NOTE — DISCHARGE INSTRUCTIONS
"Dear Patient,    Do NOT eat, drink, or smoke after midnight the night before your procedure.   Take your medications as instructed by your doctor.    Glasses and jewelry may be worn, but dentures must be removed prior to your procedure.    Leave any items you consider valuable at home.      MORNING of your Procedure, please bring the following:     -Photo ID and insurance card(s)    -ALL medications in their ORIGINAL CONTAINERS    -Co-pay and/or deductible required by your insurance   -Copy of living will or power of  document (if not brought to    Pre-Admission Testing department)   -CPAP mask and tubing, not your machine (if applicable)    -Relaxation aids (music, books, magazines)   -Skin Prep Instruction Sheet (if applicable)   -Relaxation Aids    Check in on the 2nd floor in the [facility].  Your procedure will be performed in the cath lab or EP lab.  During your procedure, your family will wait in the cath lab waiting area where you checked in.      Need to make arrangements for transportation prior to discharge.    A handout regarding \"Heart Healthy Eating\" was provided today to encourage healthy eating habits.    Booklet published by [employer] was given in Pre-Admission testing.  This booklet is for informational purposes only.  If you have any questions about your procedure, please speak with your physician.      Please note:  If you are scheduled to have one of the following procedures: Pulmonary Vein Ablation, Lead Extraction, MitraClip, Cerebral Coilings or Embolization, please let your family know that after your procedure you will be going to recovery unit on the 2nd floor of the [facility].  When the physician is finished speaking with your family after your procedure is completed, your family will be directed or escorted to the surgery waiting area in the [facility].  This is where your family will wait until you are given a room assignment and then your family will be directed to the " appropriate unit.

## 2018-08-13 ENCOUNTER — HOSPITAL ENCOUNTER (OUTPATIENT)
Facility: HOSPITAL | Age: 73
Setting detail: HOSPITAL OUTPATIENT SURGERY
Discharge: HOME | End: 2018-08-14
Attending: INTERNAL MEDICINE | Admitting: INTERNAL MEDICINE

## 2018-08-13 DIAGNOSIS — I48.3 TYPICAL ATRIAL FLUTTER (HCC): ICD-10-CM

## 2018-08-13 PROCEDURE — C1730 CATH, EP, 19 OR FEW ELECT: HCPCS | Performed by: INTERNAL MEDICINE

## 2018-08-13 PROCEDURE — 93653 COMPRE EP EVAL TX SVT: CPT | Performed by: INTERNAL MEDICINE

## 2018-08-13 PROCEDURE — 93655 ICAR CATH ABLTJ DSCRT ARRHYT: CPT | Performed by: INTERNAL MEDICINE

## 2018-08-13 PROCEDURE — C1894 INTRO/SHEATH, NON-LASER: HCPCS | Performed by: INTERNAL MEDICINE

## 2018-08-13 PROCEDURE — C1732 CATH, EP, DIAG/ABL, 3D/VECT: HCPCS | Performed by: INTERNAL MEDICINE

## 2018-08-13 PROCEDURE — 25010000002 VANCOMYCIN: Performed by: INTERNAL MEDICINE

## 2018-08-13 PROCEDURE — 25010000002 ONDANSETRON PER 1 MG: Performed by: INTERNAL MEDICINE

## 2018-08-13 PROCEDURE — 25010000002 FENTANYL CITRATE (PF) 100 MCG/2ML SOLUTION: Performed by: INTERNAL MEDICINE

## 2018-08-13 PROCEDURE — 93613 INTRACARDIAC EPHYS 3D MAPG: CPT | Performed by: INTERNAL MEDICINE

## 2018-08-13 PROCEDURE — 93286 PERI-PX EVAL PM/LDLS PM IP: CPT | Performed by: INTERNAL MEDICINE

## 2018-08-13 PROCEDURE — 25010000002 MIDAZOLAM PER 1 MG: Performed by: INTERNAL MEDICINE

## 2018-08-13 PROCEDURE — 93621 COMP EP EVL L PAC&REC C SINS: CPT | Performed by: INTERNAL MEDICINE

## 2018-08-13 RX ORDER — MIDAZOLAM HYDROCHLORIDE 1 MG/ML
INJECTION INTRAMUSCULAR; INTRAVENOUS AS NEEDED
Status: DISCONTINUED | OUTPATIENT
Start: 2018-08-13 | End: 2018-08-13 | Stop reason: HOSPADM

## 2018-08-13 RX ORDER — PROMETHAZINE HYDROCHLORIDE 25 MG/ML
12.5 INJECTION, SOLUTION INTRAMUSCULAR; INTRAVENOUS EVERY 4 HOURS PRN
Status: DISCONTINUED | OUTPATIENT
Start: 2018-08-13 | End: 2018-08-13 | Stop reason: HOSPADM

## 2018-08-13 RX ORDER — TAMSULOSIN HYDROCHLORIDE 0.4 MG/1
0.4 CAPSULE ORAL DAILY
Status: DISCONTINUED | OUTPATIENT
Start: 2018-08-14 | End: 2018-08-14 | Stop reason: HOSPADM

## 2018-08-13 RX ORDER — PANTOPRAZOLE SODIUM 40 MG/1
40 TABLET, DELAYED RELEASE ORAL EVERY MORNING
Status: DISCONTINUED | OUTPATIENT
Start: 2018-08-13 | End: 2018-08-14 | Stop reason: HOSPADM

## 2018-08-13 RX ORDER — NALOXONE HYDROCHLORIDE 0.4 MG/ML
INJECTION, SOLUTION INTRAMUSCULAR; INTRAVENOUS; SUBCUTANEOUS
Status: DISCONTINUED
Start: 2018-08-13 | End: 2018-08-14 | Stop reason: HOSPADM

## 2018-08-13 RX ORDER — SODIUM CHLORIDE 9 MG/ML
INJECTION, SOLUTION INTRAVENOUS CONTINUOUS PRN
Status: DISCONTINUED | OUTPATIENT
Start: 2018-08-13 | End: 2018-08-13 | Stop reason: HOSPADM

## 2018-08-13 RX ORDER — ONDANSETRON 2 MG/ML
INJECTION INTRAMUSCULAR; INTRAVENOUS AS NEEDED
Status: DISCONTINUED | OUTPATIENT
Start: 2018-08-13 | End: 2018-08-13 | Stop reason: HOSPADM

## 2018-08-13 RX ORDER — FENTANYL CITRATE 50 UG/ML
INJECTION, SOLUTION INTRAMUSCULAR; INTRAVENOUS AS NEEDED
Status: DISCONTINUED | OUTPATIENT
Start: 2018-08-13 | End: 2018-08-13 | Stop reason: HOSPADM

## 2018-08-13 RX ORDER — FERROUS SULFATE 325(65) MG
325 TABLET ORAL
Status: DISCONTINUED | OUTPATIENT
Start: 2018-08-13 | End: 2018-08-14 | Stop reason: HOSPADM

## 2018-08-13 RX ORDER — HYDROCODONE BITARTRATE AND ACETAMINOPHEN 7.5; 325 MG/1; MG/1
1 TABLET ORAL EVERY 4 HOURS PRN
Status: DISCONTINUED | OUTPATIENT
Start: 2018-08-13 | End: 2018-08-14 | Stop reason: HOSPADM

## 2018-08-13 RX ORDER — SODIUM CHLORIDE 0.9 % (FLUSH) 0.9 %
1-10 SYRINGE (ML) INJECTION AS NEEDED
Status: DISCONTINUED | OUTPATIENT
Start: 2018-08-13 | End: 2018-08-13 | Stop reason: HOSPADM

## 2018-08-13 RX ORDER — NITROGLYCERIN 0.4 MG/1
0.4 TABLET SUBLINGUAL
Status: DISCONTINUED | OUTPATIENT
Start: 2018-08-13 | End: 2018-08-13 | Stop reason: HOSPADM

## 2018-08-13 RX ORDER — DIGOXIN 125 MCG
125 TABLET ORAL
Status: DISCONTINUED | OUTPATIENT
Start: 2018-08-14 | End: 2018-08-13

## 2018-08-13 RX ORDER — MEPERIDINE HYDROCHLORIDE 50 MG/ML
75 INJECTION INTRAMUSCULAR; INTRAVENOUS; SUBCUTANEOUS EVERY 4 HOURS PRN
Status: DISCONTINUED | OUTPATIENT
Start: 2018-08-13 | End: 2018-08-13

## 2018-08-13 RX ORDER — ONDANSETRON 2 MG/ML
4 INJECTION INTRAMUSCULAR; INTRAVENOUS EVERY 6 HOURS PRN
Status: DISCONTINUED | OUTPATIENT
Start: 2018-08-13 | End: 2018-08-14 | Stop reason: HOSPADM

## 2018-08-13 RX ORDER — ACETAMINOPHEN 325 MG/1
650 TABLET ORAL EVERY 4 HOURS PRN
Status: DISCONTINUED | OUTPATIENT
Start: 2018-08-13 | End: 2018-08-13 | Stop reason: HOSPADM

## 2018-08-13 RX ADMIN — HYDROCODONE BITARTRATE AND ACETAMINOPHEN 1 TABLET: 7.5; 325 TABLET ORAL at 21:43

## 2018-08-13 RX ADMIN — MEPERIDINE HYDROCHLORIDE 75 MG: 50 INJECTION INTRAMUSCULAR; INTRAVENOUS; SUBCUTANEOUS at 16:48

## 2018-08-13 RX ADMIN — PANTOPRAZOLE SODIUM 40 MG: 40 TABLET, DELAYED RELEASE ORAL at 21:35

## 2018-08-13 RX ADMIN — IRON SUPPLEMENT 325 MG: 325 TABLET ORAL at 21:34

## 2018-08-13 RX ADMIN — VANCOMYCIN HYDROCHLORIDE 1500 MG: 10 INJECTION, POWDER, LYOPHILIZED, FOR SOLUTION INTRAVENOUS at 17:04

## 2018-08-13 RX ADMIN — APIXABAN 5 MG: 5 TABLET, FILM COATED ORAL at 21:34

## 2018-08-13 NOTE — NURSING NOTE
After administration of IV Demerol, patient became unresponsive and oxygen saturation dropped to 70%. Respiratory rate was 6 breaths per minute. Put the patient on 10L per mask and oxygen saturation came up to the 90s. Patient's respiratory rate increased to 12 breaths/min. Will continue to monitor.

## 2018-08-13 NOTE — H&P (VIEW-ONLY)
Tobias Still  1945    There is no work phone number on file.    07/18/2018      [facility] CARDIOLOGY     Kayode Aldrich MD  [address]  [facility] [address]    Chief Complaint   Patient presents with   • Rapid Heart Rate   • complete heart block       Problem List:    PROBLEM LIST:  1. Complete heart block/bradycardia:  a. Implantation of a dual-chamber permanent pacemaker (Medtronic), 01/19/2009.  2. Atrial fibrillation/MAT/atrial flutter:  a. Onset in 1997, treated with Tambocor, which was changed to amiodarone, Rythmol, then back to Tambocor therapy.  b. History of Corgard, atenolol, Sectral, verapamil, and Calan use, intolerant secondary to fatigue.  c. Stress echo/echo 1 year ago at [facility], no data.  d. Echocardiogram, 03/23/2010, normal LV size and function.  e. Echocardiogram, October 2014, left ventricular ejection fraction 55% to 60%, mild mitral regurgitation, mild tricuspid regurgitation.   f. Chadsvasc=1  3. Shortness of breath:  a. GXT, 06/26/2015: normal LV size and function, no ischemia.  b. Found to have significant anemia with an hematocrit of 27.  c. EGD with subsequent cauterization of an ulcer, database incomplete.  4. Chronic back pain.  5. Surgical history:  a. Left ankle.  b. Left wrist.  c. Right shoulder.  d. Right wrist.  Allergies  No Known Allergies    Current Medications    Current Outpatient Prescriptions:   •  aspirin 81 MG EC tablet, Take 81 mg by mouth daily., Disp: , Rfl:   •  digoxin (LANOXIN) 125 MCG tablet, Take 250 mcg by mouth Daily., Disp: , Rfl:   •  ferrous sulfate 325 (65 FE) MG tablet, Take 325 mg by mouth 3 (Three) Times a Day With Meals., Disp: , Rfl:   •  flecainide (TAMBOCOR) 100 MG tablet, Take 100 mg by mouth 2 (two) times a day., Disp: , Rfl:   •  HYDROcodone-acetaminophen (NORCO)  MG per tablet, Take 1 tablet by mouth Every 8 (Eight) Hours As Needed for Moderate Pain ., Disp: ,  "Rfl:   •  omeprazole (PriLOSEC) 20 MG capsule, Take 20 mg by mouth 2 (Two) Times a Day., Disp: , Rfl:   •  tamsulosin (FLOMAX) 0.4 MG capsule 24 hr capsule, Take 1 capsule by mouth every night., Disp: , Rfl:     History of Present Illness   HPI    Pt presents for follow up of CHB/AT/AFL .Since the pt has seen us in clinic last, pt was on eliquis but stopped taking med after he ran out of samples. He feels palps. He denies any syncope, SOB, CP, LH, and dizziness. Denies any hospitalizations, ER visits, bleeding, or TIA/CVA symptoms. Overall feels well.    ROS:  General:  + fatigue, No weight gain or loss  Cardiovascular:  Denies CP, PND, syncope, near syncope, edema + palpitations.  Pulmonary:  Denies MOSES, cough, or wheezing    Vitals:    07/18/18 1334   BP: 128/70   BP Location: Right arm   Patient Position: Sitting   Pulse: 70   Weight: 77.1 kg (170 lb)   Height: 170.2 cm (67\")       PE:  General: NAD  Neck: no JVD, no carotid bruits, no TM  Heart RRR, NL S1, S2, S4 present, no rubs, murmurs  Lungs: CTA, no wheezes, rhonchi, or rales  Abd: soft, non-tender, NL BS  Ext: No musculoskeletal deformities, no edema, cyanosis, or clubbing  Psych: normal mood and affect    Diagnostic Data:  Procedures      1. Typical atrial flutter (CMS/HCC)    2. Atrial tachycardia (CMS/HCC)    3. Complete heart block (CMS/HCC)        PM Interrogation: NL PM fxn, Nl battery fxn, 8% AT and AFL     Plan:  1) AT/AFL: recurrent on flecainide: needs EPS with RFA of AT/AFL: pt to decide and let us know. Hold flecainide 3 days prior  2) AVB  Continue present medications. NL pacemaker function.  3) Anticoagulation  Restart NOAC    F/up in 6 months      "

## 2018-08-13 NOTE — INTERVAL H&P NOTE
H&P reviewed. The patient was examined and there are no changes to the H&P.   Labs reviewed.  Patient has been compliant with Eliquis with no missed doses in the last 30 days.  He has held his flecainide for the last 5 days in anticipation of procedure today.  We will proceed with EPS +/- RFA AT/Atrial flutter with Dr. Newell.  The risks, benefits, and alternatives of the procedure have been reviewed and the patient wishes to proceed.     SHANNON Pan  [facility]  8/13/2018  12:22 PM

## 2018-08-14 VITALS
OXYGEN SATURATION: 93 % | RESPIRATION RATE: 10 BRPM | SYSTOLIC BLOOD PRESSURE: 120 MMHG | WEIGHT: 171.74 LBS | TEMPERATURE: 97.6 F | HEART RATE: 80 BPM | DIASTOLIC BLOOD PRESSURE: 73 MMHG | BODY MASS INDEX: 26.96 KG/M2 | HEIGHT: 67 IN

## 2018-08-14 PROCEDURE — 93005 ELECTROCARDIOGRAM TRACING: CPT | Performed by: INTERNAL MEDICINE

## 2018-08-14 PROCEDURE — 99213 OFFICE O/P EST LOW 20 MIN: CPT | Performed by: NURSE PRACTITIONER

## 2018-08-14 PROCEDURE — 93010 ELECTROCARDIOGRAM REPORT: CPT | Performed by: INTERNAL MEDICINE

## 2018-08-14 NOTE — PROGRESS NOTES
"[facility]  Discharge Progress Note     LOS: 1 day   Patient Care Team:  Kayode Aldrich MD as PCP - General  Kayode Aldrich MD as PCP - Family Medicine    Chief Complaint:  Atrial flutter    Subjective     Interval History: Patient is s/p RFA of SVT and atrial flutter yesterday and has done well overnight.  He denies any c/o CP or SOB.  Mild neck pain from IV site.        Review of Systems:   Pertinent positives in HPI, all others reviewed and negative.      Objective       Current Facility-Administered Medications:   •  apixaban (ELIQUIS) tablet 5 mg, 5 mg, Oral, Q12H, Lolis Franks APRN, 5 mg at 08/13/18 2134  •  ferrous sulfate tablet 325 mg, 325 mg, Oral, TID With Meals, Lolis Franks APRN, 325 mg at 08/13/18 2134  •  HYDROcodone-acetaminophen (NORCO) 7.5-325 MG per tablet 1 tablet, 1 tablet, Oral, Q4H PRN, Donn Newell MD, 1 tablet at 08/13/18 2143  •  naloxone (NARCAN) 0.4 MG/ML injection  - ADS Override Pull, , , ,   •  ondansetron (ZOFRAN) injection 4 mg, 4 mg, Intravenous, Q6H PRN, Donn Newell MD  •  pantoprazole (PROTONIX) EC tablet 40 mg, 40 mg, Oral, QAM, Lolis Franks APRN, 40 mg at 08/13/18 2135  •  tamsulosin (FLOMAX) 24 hr capsule 0.4 mg, 0.4 mg, Oral, Daily, Lolis Franks APRN    Vital Sign Min/Max for last 24 hours  Temp  Min: 97.6 °F (36.4 °C)  Max: 97.6 °F (36.4 °C)   BP  Min: 95/54  Max: 142/88   Pulse  Min: 70  Max: 87   Resp  Min: 10  Max: 16   SpO2  Min: 69 %  Max: 98 %   No Data Recorded   Weight  Min: 77.9 kg (171 lb 11.8 oz)  Max: 77.9 kg (171 lb 11.8 oz)     Flowsheet Rows      First Filed Value   Admission Height  170.2 cm (67\") Documented at 08/13/2018 1209   Admission Weight  77.9 kg (171 lb 11.8 oz) Documented at 08/13/2018 1209          Physical Exam:     General Appearance:    Alert, cooperative, in no acute distress   Lungs:     Clear to auscultation, " respirations regular, even and                  unlabored    Heart:    Regular rhythm and normal rate, normal S1 and S2, no            murmur, no gallop, no rub, no click   Chest Wall:    No abnormalities observed   Abdomen:     Normal bowel sounds, soft, non-tender, non-distended   Extremities:   Moves all extremities well, no edema, no cyanosis, no             redness   Pulses:   Pulses palpable and equal bilaterally   Skin:   Groin and neck sites are clean, dry and intact. No bleeding, bruising, or hematoma.         Results Review:     Results from last 7 days  Lab Units 08/12/18  1542   WBC 10*3/mm3 5.59   HEMOGLOBIN g/dL 14.0   HEMATOCRIT % 42.1   PLATELETS 10*3/mm3 267       Results from last 7 days  Lab Units 08/12/18  1542   SODIUM mmol/L 141   POTASSIUM mmol/L 3.9   CHLORIDE mmol/L 107   CO2 mmol/L 30.0   BUN mg/dL 12   CREATININE mg/dL 0.84   GLUCOSE mg/dL 113*        Results from last 7 days  Lab Units 08/12/18  1542   HEMOGLOBIN A1C % 6.10*                   Results from last 7 days  Lab Units 08/12/18  1542   PROTIME Seconds 11.1   INR  1.06         No intake or output data in the 24 hours ending 08/14/18 0709    I personally viewed and interpreted the patient's EKG/Telemetry data    EKG: A paced, HR 76 bpm    Telemetry: A paced, HR 70-87 bpm      Present on Admission:  **None**    Assessment/Plan   1. SVT/Typical atrial flutter:  - Patient is s/p EPS yesterday and RFA of AVNRT of the common type and right atrial isthmus dependent flutter.  Pt has done well over night. Medications, wound care and follow have been reviewed with the patient.   - Continue Eliquis 5 mg BID    Plan for disposition: The patient is stable and will be discharged to home today with plan for follow up with Dr. Newell in 3 months with device check.    Lolis Franks, APRN  08/14/18  7:09 AM

## 2018-08-14 NOTE — PLAN OF CARE
Problem: Patient Care Overview  Goal: Plan of Care Review  Outcome: Ongoing (interventions implemented as appropriate)   08/14/18 0632   Coping/Psychosocial   Plan of Care Reviewed With patient;spouse   Plan of Care Review   Progress improving   OTHER   Outcome Summary V/S STABLE. PROCEDURE SITES CLEAN, DRY, INTACT WITH TEGADERM/GAUZE. CHRONIC BACK PAIN TREATED WITH PO HOME MED. AND ICE PACK PRN.       Problem: Arrhythmia/Dysrhythmia (Symptomatic) (Adult)  Goal: Signs and Symptoms of Listed Potential Problems Will be Absent, Minimized or Managed (Arrhythmia/Dysrhythmia)  Outcome: Ongoing (interventions implemented as appropriate)   08/14/18 0632   Goal/Outcome Evaluation   Problems Assessed (Arrhythmia/Dysrhythmia) all   Problems Present (Dysrhythmia) none

## 2018-09-27 ENCOUNTER — CLINICAL SUPPORT NO REQUIREMENTS (OUTPATIENT)
Dept: CARDIOLOGY | Facility: CLINIC | Age: 73
End: 2018-09-27

## 2018-09-27 DIAGNOSIS — I44.2 COMPLETE HEART BLOCK (HCC): ICD-10-CM

## 2018-09-27 DIAGNOSIS — I48.0 PAROXYSMAL ATRIAL FIBRILLATION (HCC): ICD-10-CM

## 2018-09-27 PROCEDURE — 93294 REM INTERROG EVL PM/LDLS PM: CPT | Performed by: INTERNAL MEDICINE

## 2018-09-27 PROCEDURE — 93296 REM INTERROG EVL PM/IDS: CPT | Performed by: INTERNAL MEDICINE

## 2018-10-01 RX ORDER — APIXABAN 5 MG/1
TABLET, FILM COATED ORAL
Qty: 60 TABLET | Refills: 6 | Status: SHIPPED | OUTPATIENT
Start: 2018-10-01 | End: 2019-07-24

## 2018-11-28 ENCOUNTER — OFFICE VISIT (OUTPATIENT)
Dept: CARDIOLOGY | Facility: CLINIC | Age: 73
End: 2018-11-28

## 2018-11-28 VITALS
HEIGHT: 67 IN | DIASTOLIC BLOOD PRESSURE: 72 MMHG | WEIGHT: 177 LBS | OXYGEN SATURATION: 97 % | BODY MASS INDEX: 27.78 KG/M2 | SYSTOLIC BLOOD PRESSURE: 140 MMHG | HEART RATE: 75 BPM

## 2018-11-28 DIAGNOSIS — I48.0 PAROXYSMAL ATRIAL FIBRILLATION (HCC): ICD-10-CM

## 2018-11-28 DIAGNOSIS — I48.3 TYPICAL ATRIAL FLUTTER (HCC): ICD-10-CM

## 2018-11-28 DIAGNOSIS — I47.1 AVNRT (AV NODAL RE-ENTRY TACHYCARDIA) (HCC): Primary | ICD-10-CM

## 2018-11-28 DIAGNOSIS — R00.1 BRADYCARDIA: ICD-10-CM

## 2018-11-28 PROBLEM — I47.19 AVNRT (AV NODAL RE-ENTRY TACHYCARDIA): Status: ACTIVE | Noted: 2018-11-28

## 2018-11-28 PROCEDURE — 99213 OFFICE O/P EST LOW 20 MIN: CPT | Performed by: INTERNAL MEDICINE

## 2018-11-28 PROCEDURE — 93280 PM DEVICE PROGR EVAL DUAL: CPT | Performed by: INTERNAL MEDICINE

## 2018-11-28 NOTE — PROGRESS NOTES
Tobias Still  1945  [phone]    11/28/2018    [facility], Kayode Garrido MD  [address] [place] [id number]    Chief Complaint   Patient presents with   • Atrial Fibrillation         PROBLEM LIST:  1. Complete heart block/bradycardia:  a. Implantation of a dual-chamber permanent pacemaker (Medtronic), 01/19/2009.  2. Atrial fibrillation/MAT/atrial flutter:  a. Onset in 1997, treated with Tambocor, which was changed to amiodarone, Rythmol, then back to Tambocor therapy.  b. History of Corgard, atenolol, Sectral, verapamil, and Calan use, intolerant secondary to fatigue.  c. Stress echo/echo 1 year ago at [facility], no data.  d. Echocardiogram, 03/23/2010, normal LV size and function.  e. Echocardiogram, October 2014, left ventricular ejection fraction 55% to 60%, mild mitral regurgitation, mild tricuspid regurgitation.   f. Chadsvasc=1  g. EPS 8/13/18: EPS + RFA of AVNRT common type and RFA of RA flutter  3. Shortness of breath:  a. GXT, 06/26/2015: normal LV size and function, no ischemia.  b. Found to have significant anemia with an hematocrit of 27.  c. EGD with subsequent cauterization of an ulcer, database incomplete.  4. Chronic back pain.  5. Surgical history:  a. Left ankle.  b. Left wrist.  c. Right shoulder.  d. Right wrist          Allergies  No Known Allergies    Current Medications    Current Outpatient Medications:   •  ELIQUIS 5 MG tablet tablet, TAKE 1 TABLET EVERY 12 HOURS, Disp: 60 tablet, Rfl: 6  •  ferrous sulfate 325 (65 FE) MG tablet, Take 325 mg by mouth 3 (Three) Times a Day With Meals., Disp: , Rfl:   •  HYDROcodone-acetaminophen (NORCO)  MG per tablet, Take 1 tablet by mouth Every 8 (Eight) Hours As Needed for Moderate Pain ., Disp: , Rfl:   •  omeprazole (PriLOSEC) 20 MG capsule, Take 20 mg by mouth Daily., Disp: , Rfl:   •  tamsulosin (FLOMAX) 0.4 MG capsule 24 hr capsule, Take 1 capsule  "by mouth every night., Disp: , Rfl:     History of Present Illness     Pt presents for follow up of atrial arrhythmias including atrial fibrillation, atrial flutter and AVNRT s/p RFA in August. Since we last saw the pt, he is overall doing well. He has occasional palpitations that do not last longer than a few seconds. No CP. He has mild chronic SOB which is unchanged. No issues with Eliquis or CVA symptoms. No hospitalizations or ER visits. BP is well controlled at home.     ROS:  General:  Denies fatigue, weight gain or loss  Cardiovascular:  Denies CP, PND, syncope, near syncope, edema + occasional episodes short palpitations.  Pulmonary:  Denies MOSES, cough, or wheezing      Vitals:    11/28/18 0924   BP: 140/72   BP Location: Left arm   Patient Position: Sitting   Pulse: 75   SpO2: 97%   Weight: 80.3 kg (177 lb)   Height: 170.2 cm (67\")     Body mass index is 27.72 kg/m².  PE:  General: NAD. A & O x 3   Neck: no JVD, no carotid bruits, no TM  Heart RRR, NL S1, S2, S4 present, no rubs, murmurs, PMI NL  Lungs: CTA, no wheezes, rhonchi, or rales  Abd: soft, non-tender, NL BS  Ext: No musculoskeletal deformities, no edema, cyanosis, or clubbing  Psych: normal mood and affect    Diagnostic Data:    PM check: Less than 1% atrial fibrillation (longest 4 minutes). No SVT or atrial flutter. 8 years on battery.       ECG 12 Lead  Date/Time: 11/28/2018 9:56 AM  Performed by: Donn Newell MD  Authorized by: Donn Newell MD   Comparison: compared with previous ECG from 8/14/2018  Similar to previous ECG  Rhythm: sinus rhythm and paced  BPM: 75              1. AVNRT (AV raphael re-entry tachycardia) (CMS/HCC)    2. Typical atrial flutter (CMS/HCC)    3. Paroxysmal atrial fibrillation (CMS/HCC)    4. Bradycardia          Plan:  1. AVNRT:  - s/p ablation with no recurrences    2. Typical Atrial Flutter:  - s/p ablation with no recurrences    3. PAF:  - less than 1% burden, but does have fast rates, only brief episodes " (longest 4 minutes). Has been intolerant to BB in the past due to fatigue. Will monitor for now  CHADSVAsc = 1 on Eliquis. He has a 2.8% CVA risk per year if not on Eliquis. He would like to stop Eliqius. He will start ASA 81 mg daily to take with food. If he has a long episode of atrial fibrillation more than 30 minutes or we find more frequency on remote monitor, he will need to take Eliquis.     4. Bradycardia:  - normal PM function        F/up in 6 months    Scribed for Donn Newell MD by Jerrica Hernandez PA-C. 11/28/2018  9:57 AM     I, Donn Newell MD, personally performed the services described in this documentation as scribed by the above named individual in my presence, and it is both accurate and complete.  11/28/2018  10:19 AM

## 2019-01-16 ENCOUNTER — CLINICAL SUPPORT NO REQUIREMENTS (OUTPATIENT)
Dept: CARDIOLOGY | Facility: CLINIC | Age: 74
End: 2019-01-16

## 2019-01-16 DIAGNOSIS — I47.1 AVNRT (AV NODAL RE-ENTRY TACHYCARDIA) (HCC): Primary | ICD-10-CM

## 2019-01-16 DIAGNOSIS — I48.3 TYPICAL ATRIAL FLUTTER (HCC): ICD-10-CM

## 2019-01-16 PROCEDURE — 93296 REM INTERROG EVL PM/IDS: CPT | Performed by: INTERNAL MEDICINE

## 2019-01-16 PROCEDURE — 93294 REM INTERROG EVL PM/LDLS PM: CPT | Performed by: INTERNAL MEDICINE

## 2019-04-24 ENCOUNTER — CLINICAL SUPPORT NO REQUIREMENTS (OUTPATIENT)
Dept: CARDIOLOGY | Facility: CLINIC | Age: 74
End: 2019-04-24

## 2019-04-24 DIAGNOSIS — I48.0 PAROXYSMAL ATRIAL FIBRILLATION (HCC): ICD-10-CM

## 2019-04-24 DIAGNOSIS — I44.2 COMPLETE HEART BLOCK (HCC): ICD-10-CM

## 2019-04-24 PROCEDURE — 93294 REM INTERROG EVL PM/LDLS PM: CPT | Performed by: INTERNAL MEDICINE

## 2019-04-24 PROCEDURE — 93296 REM INTERROG EVL PM/IDS: CPT | Performed by: INTERNAL MEDICINE

## 2019-07-24 ENCOUNTER — OFFICE VISIT (OUTPATIENT)
Dept: CARDIOLOGY | Facility: CLINIC | Age: 74
End: 2019-07-24

## 2019-07-24 VITALS
SYSTOLIC BLOOD PRESSURE: 120 MMHG | DIASTOLIC BLOOD PRESSURE: 80 MMHG | BODY MASS INDEX: 27.62 KG/M2 | WEIGHT: 176 LBS | HEART RATE: 70 BPM | HEIGHT: 67 IN | OXYGEN SATURATION: 95 %

## 2019-07-24 DIAGNOSIS — I48.0 PAROXYSMAL ATRIAL FIBRILLATION (HCC): ICD-10-CM

## 2019-07-24 DIAGNOSIS — I47.1 AVNRT (AV NODAL RE-ENTRY TACHYCARDIA) (HCC): Primary | ICD-10-CM

## 2019-07-24 DIAGNOSIS — I48.3 TYPICAL ATRIAL FLUTTER (HCC): ICD-10-CM

## 2019-07-24 DIAGNOSIS — I44.2 COMPLETE HEART BLOCK (HCC): ICD-10-CM

## 2019-07-24 PROCEDURE — 93280 PM DEVICE PROGR EVAL DUAL: CPT | Performed by: INTERNAL MEDICINE

## 2019-07-24 PROCEDURE — 99213 OFFICE O/P EST LOW 20 MIN: CPT | Performed by: INTERNAL MEDICINE

## 2019-07-24 RX ORDER — ASPIRIN 81 MG/1
81 TABLET ORAL DAILY
COMMUNITY

## 2019-07-24 NOTE — PROGRESS NOTES
Tobias Still  1945  [phone]    07/24/2019    [facility], Kayode Garrido MD  [address] [place] [id number]    Chief Complaint   Patient presents with   • Atrial Fibrillation       PROBLEM LIST:  1. Complete heart block/bradycardia:  a. Implantation of a dual-chamber permanent pacemaker (Medtronic), 01/19/2009.  2. Atrial fibrillation/MAT/atrial flutter:  a. Onset in 1997, treated with Tambocor, which was changed to amiodarone, Rythmol, then back to Tambocor therapy.  b. History of Corgard, atenolol, Sectral, verapamil, and Calan use, intolerant secondary to fatigue.  c. Stress echo/echo 1 year ago at [facility], no data.  d. Echocardiogram, 03/23/2010, normal LV size and function.  e. Echocardiogram, October 2014, left ventricular ejection fraction 55% to 60%, mild mitral regurgitation, mild tricuspid regurgitation.   f. Chadsvasc=1  g. EPS 8/13/18: EPS + RFA of AVNRT common type and RFA of RA flutter  3. Shortness of breath:  a. GXT, 06/26/2015: normal LV size and function, no ischemia.  b. Found to have significant anemia with an hematocrit of 27.  c. EGD with subsequent cauterization of an ulcer, database incomplete.  4. Chronic back pain.  5. Surgical history:  a. Left ankle.  b. Left wrist.  c. Right shoulder.  d. Right wrist      Allergies  Allergies   Allergen Reactions   • Demerol [Meperidine] Anaphylaxis       Current Medications    Current Outpatient Medications:   •  aspirin 81 MG EC tablet, Take 81 mg by mouth Daily., Disp: , Rfl:   •  HYDROcodone-acetaminophen (NORCO)  MG per tablet, Take 1 tablet by mouth Every 8 (Eight) Hours As Needed for Moderate Pain ., Disp: , Rfl:   •  omeprazole (PriLOSEC) 20 MG capsule, Take 20 mg by mouth Daily., Disp: , Rfl:   •  tamsulosin (FLOMAX) 0.4 MG capsule 24 hr capsule, Take 1 capsule by mouth every night., Disp: , Rfl:     History of Present Illness     Pt presents  "for follow up of atrial fibrillation, AVNRT, atrial flutter, CHB and PM check.  Since we last saw the pt, he has had a couple episodes of atrial fibrillation, mild in nature. He is not sure if he took his Eliquis. Otherwise, he denies SOB, CP, LH, and dizziness. Denies any hospitalizations, ER visits, bleeding issues on ASA, or TIA/CVA symptoms. Overall feels well.    ROS:  General:  Denies fatigue, weight gain or loss  Cardiovascular:  Denies CP, PND, syncope, near syncope, edema or palpitations.  Pulmonary:  Denies MOSES, cough, or wheezing      Vitals:    07/24/19 1541   BP: 120/80   BP Location: Right arm   Patient Position: Sitting   Pulse: 70   SpO2: 95%   Weight: 79.8 kg (176 lb)   Height: 170.2 cm (67\")     Body mass index is 27.57 kg/m².  PE:  General: NAD. A & O x 3   Neck: no JVD, no carotid bruits, no TM  Heart RRR, NL S1, S2, S4 present, no rubs, murmurs  Lungs: CTA, no wheezes, rhonchi, or rales  Abd: soft, non-tender, NL BS  Ext: No musculoskeletal deformities, no edema, cyanosis, or clubbing  Psych: normal mood and affect    Diagnostic Data:    PM interrogation: normal function. Less than 1% mode switched. Longest greater than 1 hour, but less than 24 hours. 7 years on battery.     Procedures    1. AVNRT (AV raphael re-entry tachycardia) (CMS/HCC)    2. Typical atrial flutter (CMS/HCC)    3. Paroxysmal atrial fibrillation (CMS/HCC)    4. Complete heart block (CMS/HCC)          Plan:  1. AVNRT:  - s/p ablation with no recurrences     2. Typical Atrial Flutter:  - s/p ablation with no recurrences     3. PAF:  - less than 1% mode switch on device interrogation, longest greater than one hour  CHADSVAsc = 1 on ASA. Eliquis prn for episodes of atrial fibrillation.      4. Bradycardia/CHB:  - normal PM function        F/up in 8 months    Scribed for Donn Newell MD by Jerrica Hernandez PA-C. 7/24/2019  4:03 PM     I, Donn Newell MD, personally performed the services described in this documentation as " scribed by the above named individual in my presence, and it is both accurate and complete.  7/24/2019  4:09 PM

## 2019-10-24 ENCOUNTER — CLINICAL SUPPORT NO REQUIREMENTS (OUTPATIENT)
Dept: CARDIOLOGY | Facility: CLINIC | Age: 74
End: 2019-10-24

## 2019-10-24 DIAGNOSIS — I44.2 COMPLETE HEART BLOCK (HCC): ICD-10-CM

## 2019-10-24 PROCEDURE — 93294 REM INTERROG EVL PM/LDLS PM: CPT | Performed by: INTERNAL MEDICINE

## 2019-10-24 PROCEDURE — 93296 REM INTERROG EVL PM/IDS: CPT | Performed by: INTERNAL MEDICINE

## 2020-01-23 ENCOUNTER — CLINICAL SUPPORT NO REQUIREMENTS (OUTPATIENT)
Dept: CARDIOLOGY | Facility: CLINIC | Age: 75
End: 2020-01-23

## 2020-01-23 DIAGNOSIS — I44.2 COMPLETE HEART BLOCK (HCC): ICD-10-CM

## 2020-01-23 DIAGNOSIS — R00.1 BRADYCARDIA: ICD-10-CM

## 2020-01-23 DIAGNOSIS — I48.0 PAROXYSMAL ATRIAL FIBRILLATION (HCC): ICD-10-CM

## 2020-01-23 DIAGNOSIS — I47.1 AVNRT (AV NODAL RE-ENTRY TACHYCARDIA) (HCC): ICD-10-CM

## 2020-01-23 PROCEDURE — 93294 REM INTERROG EVL PM/LDLS PM: CPT | Performed by: INTERNAL MEDICINE

## 2020-01-23 PROCEDURE — 93296 REM INTERROG EVL PM/IDS: CPT | Performed by: INTERNAL MEDICINE

## 2020-05-06 ENCOUNTER — DOCUMENTATION (OUTPATIENT)
Dept: CARDIOLOGY | Facility: CLINIC | Age: 75
End: 2020-05-06

## 2020-05-06 NOTE — PROGRESS NOTES
Called patient today regarding his upcoming appointment on 5/13/2020. He does not want to come into the office due to risk of exposure to COVID-19. I offered a telephone/video visit, but he would like to reschedule. He is aware that he needs to take Eliquis prn for any prolonged episodes of atrial fibrillation. He states he is doing well and does not think a telephone visit is necessary at this time. He knows to call us back if any issues arise.

## 2020-11-04 ENCOUNTER — OFFICE VISIT (OUTPATIENT)
Dept: CARDIOLOGY | Facility: CLINIC | Age: 75
End: 2020-11-04

## 2020-11-04 VITALS
SYSTOLIC BLOOD PRESSURE: 122 MMHG | HEIGHT: 68 IN | DIASTOLIC BLOOD PRESSURE: 80 MMHG | HEART RATE: 65 BPM | WEIGHT: 185 LBS | BODY MASS INDEX: 28.04 KG/M2 | TEMPERATURE: 97.5 F

## 2020-11-04 DIAGNOSIS — R00.1 BRADYCARDIA: ICD-10-CM

## 2020-11-04 DIAGNOSIS — I48.0 PAROXYSMAL ATRIAL FIBRILLATION (HCC): ICD-10-CM

## 2020-11-04 DIAGNOSIS — I48.3 TYPICAL ATRIAL FLUTTER (HCC): ICD-10-CM

## 2020-11-04 DIAGNOSIS — I47.1 AVNRT (AV NODAL RE-ENTRY TACHYCARDIA) (HCC): Primary | ICD-10-CM

## 2020-11-04 PROCEDURE — 93280 PM DEVICE PROGR EVAL DUAL: CPT | Performed by: INTERNAL MEDICINE

## 2020-11-04 PROCEDURE — 99213 OFFICE O/P EST LOW 20 MIN: CPT | Performed by: INTERNAL MEDICINE

## 2020-11-04 NOTE — PROGRESS NOTES
Tobias BARKER Tessy  1945  [phone]      11/04/2020      [facility], Kayode Garrido MD  [address] [place] [id number]    Chief Complaint   Patient presents with   • CHB   • Atrial Fibrillation       PROBLEM LIST:  1. Complete heart block/bradycardia:  a. Implantation of a dual-chamber permanent pacemaker (Medtronic), 01/19/2009.  2. Atrial fibrillation/MAT/atrial flutter:  a. Onset in 1997, treated with Tambocor, which was changed to amiodarone, Rythmol, then back to Tambocor therapy.  b. History of Corgard, atenolol, Sectral, verapamil, and Calan use, intolerant secondary to fatigue.  c. Stress echo/echo 1 year ago at [facility], no data.  d. Echocardiogram, 03/23/2010, normal LV size and function.  e. Echocardiogram, October 2014, left ventricular ejection fraction 55% to 60%, mild mitral regurgitation, mild tricuspid regurgitation.   f. Chadsvasc=1  g. EPS 8/13/18: EPS + RFA of AVNRT common type and RFA of RA flutter  3. Shortness of breath:  a. GXT, 06/26/2015: normal LV size and function, no ischemia.  b. Found to have significant anemia with an hematocrit of 27.  c. EGD with subsequent cauterization of an ulcer, database incomplete.  4. Chronic back pain.  5. Surgical history:  a. Left ankle.  b. Left wrist.  c. Right shoulder.  d. Right wrist    Allergies  Allergies   Allergen Reactions   • Demerol [Meperidine] Anaphylaxis       Current Medications    Current Outpatient Medications:   •  aspirin 81 MG EC tablet, Take 81 mg by mouth Daily., Disp: , Rfl:   •  HYDROcodone-acetaminophen (NORCO)  MG per tablet, Take 1 tablet by mouth Every 8 (Eight) Hours As Needed for Moderate Pain ., Disp: , Rfl:   •  omeprazole (PriLOSEC) 20 MG capsule, Take 20 mg by mouth Daily., Disp: , Rfl:   •  tamsulosin (FLOMAX) 0.4 MG capsule 24 hr capsule, Take 1 capsule by mouth every night., Disp: , Rfl:     History of Present Illness  "    Pt presents for follow up of SSS/AF/SVT. Since the pt has seen us in clinic last, pt denies any syncope, SOB, CP, LH, and dizziness. Denies any hospitalizations, ER visits, bleeding, or TIA/CVA symptoms. Overall feels well.    ROS:  General:  Denies fatigue, weight gain or loss  Cardiovascular:  Denies CP, PND, syncope, near syncope, edema or palpitations.  Pulmonary:  Denies MOSES, cough, or wheezing    Vitals:    11/04/20 1436   BP: 122/80   BP Location: Right arm   Patient Position: Sitting   Pulse: 65   Temp: 97.5 °F (36.4 °C)   Weight: 83.9 kg (185 lb)   Height: 171.5 cm (67.5\")       PE:  General: NAD  Neck: no JVD, no carotid bruits, no TM  Heart: RRR, NL S1, S2, S4 present, no rubs, murmurs  Lungs: CTA, no wheezes, rhonchi, or rales  Abd: soft, non-tender, NL BS  Ext: No musculoskeletal deformities, no edema, cyanosis, or clubbing  Psych: normal mood and affect    Diagnostic Data:  Procedures      1. AVNRT (AV raphael re-entry tachycardia) (CMS/HCC)    2. Typical atrial flutter (CMS/HCC)    3. Paroxysmal atrial fibrillation (CMS/HCC)    4. Bradycardia        PM Interrogation: NL PM fxn, Nl battery fxn, <1% AF, 75% RA paced, <1% RV paced     Plan:  1. AVNRT:  - s/p ablation with no recurrences     2. Typical Atrial Flutter:  - s/p ablation with no recurrences     3. PAF:  - less than 1% mode switch on device interrogation, longest greater than one hour  CHADSVAsc = 1 on ASA.Pt does not want daily OAC. Eliquis prn for episodes of atrial fibrillation.      4. Bradycardia/CHB:  - normal PM function    F/up in 12 months      "

## 2021-05-04 PROCEDURE — 93296 REM INTERROG EVL PM/IDS: CPT | Performed by: INTERNAL MEDICINE

## 2021-05-04 PROCEDURE — 93294 REM INTERROG EVL PM/LDLS PM: CPT | Performed by: INTERNAL MEDICINE

## 2021-08-03 PROCEDURE — 93296 REM INTERROG EVL PM/IDS: CPT | Performed by: INTERNAL MEDICINE

## 2021-08-03 PROCEDURE — 93294 REM INTERROG EVL PM/LDLS PM: CPT | Performed by: INTERNAL MEDICINE

## 2021-11-02 PROCEDURE — 93294 REM INTERROG EVL PM/LDLS PM: CPT | Performed by: INTERNAL MEDICINE

## 2021-11-02 PROCEDURE — 93296 REM INTERROG EVL PM/IDS: CPT | Performed by: INTERNAL MEDICINE

## 2021-12-08 ENCOUNTER — OFFICE VISIT (OUTPATIENT)
Dept: CARDIOLOGY | Facility: CLINIC | Age: 76
End: 2021-12-08

## 2021-12-08 VITALS
WEIGHT: 179.6 LBS | HEART RATE: 70 BPM | BODY MASS INDEX: 27.22 KG/M2 | DIASTOLIC BLOOD PRESSURE: 68 MMHG | HEIGHT: 68 IN | OXYGEN SATURATION: 95 % | SYSTOLIC BLOOD PRESSURE: 140 MMHG

## 2021-12-08 DIAGNOSIS — R00.1 BRADYCARDIA: ICD-10-CM

## 2021-12-08 DIAGNOSIS — I48.3 TYPICAL ATRIAL FLUTTER (HCC): ICD-10-CM

## 2021-12-08 DIAGNOSIS — I47.1 AVNRT (AV NODAL RE-ENTRY TACHYCARDIA) (HCC): Primary | ICD-10-CM

## 2021-12-08 DIAGNOSIS — I48.0 PAROXYSMAL ATRIAL FIBRILLATION (HCC): ICD-10-CM

## 2021-12-08 PROCEDURE — 99214 OFFICE O/P EST MOD 30 MIN: CPT | Performed by: INTERNAL MEDICINE

## 2021-12-08 RX ORDER — PANTOPRAZOLE SODIUM 40 MG/1
1 TABLET, DELAYED RELEASE ORAL DAILY
COMMUNITY
Start: 2021-12-01

## 2021-12-08 NOTE — PROGRESS NOTES
Tobias Still  1945  [phone]    12/08/2021    [facility] CARDIOLOGY     Referring Provider: No ref. provider found     Kayode Aldrich MD  [address] [place] [id number]    Chief Complaint   Patient presents with   • Atrial Fibrillation       Problem List:     1. Complete heart block/bradycardia:  a. Implantation of a dual-chamber permanent pacemaker (Medtronic), 01/19/2009.  2. Atrial fibrillation/MAT/atrial flutter:  a. Onset in 1997, treated with Tambocor, which was changed to amiodarone, Rythmol, then back to Tambocor therapy.  b. History of Corgard, atenolol, Sectral, verapamil, and Calan use, intolerant secondary to fatigue.  c. Stress echo/echo 1 year ago at [facility], no data.  d. Echocardiogram, 03/23/2010, normal LV size and function.  e. Echocardiogram, October 2014, left ventricular ejection fraction 55% to 60%, mild mitral regurgitation, mild tricuspid regurgitation.   f. Chadsvasc=1  g. EPS 8/13/18: EPS + RFA of AVNRT common type and RFA of RA flutter  3. Shortness of breath:  a. GXT, 06/26/2015: normal LV size and function, no ischemia.  b. Found to have significant anemia with an hematocrit of 27.  c. EGD with subsequent cauterization of an ulcer, database incomplete.  4. Chronic back pain.  5. Surgical history:  a. Left ankle.  b. Left wrist.  c. Right shoulder.  d. Right wrist    Allergies  Allergies   Allergen Reactions   • Demerol [Meperidine] Anaphylaxis       Current Medications    Current Outpatient Medications:   •  aspirin 81 MG EC tablet, Take 81 mg by mouth Daily., Disp: , Rfl:   •  HYDROcodone-acetaminophen (NORCO)  MG per tablet, Take 1 tablet by mouth Every 8 (Eight) Hours As Needed for Moderate Pain ., Disp: , Rfl:   •  omeprazole (PriLOSEC) 20 MG capsule, Take 20 mg by mouth Daily., Disp: , Rfl:   •  tamsulosin (FLOMAX) 0.4 MG capsule 24 hr capsule, Take 1 capsule by mouth every night., Disp: , Rfl:   •   "pantoprazole (PROTONIX) 40 MG EC tablet, Take 1 tablet by mouth Daily., Disp: , Rfl:     History of Present Illness     Pt presents for follow up of SSS/AF/SVT. Since we last saw the pt, pt denies any significant AF episodes, SOB, CP, LH, and dizziness. He does have rare episodes of AF that are brief he will feel mild palps with, no known triggers. Denies any hospitalizations, ER visits, bleeding issues on ASA, or TIA/CVA symptoms. He did have Covid in September, unfortunately he lost his wife to covid in September as well. Overall feels well.    ROS:  General:  Denies fatigue, weight gain or loss  Cardiovascular:  Denies CP, PND, syncope, near syncope, edema + palpitations.  Pulmonary:  Denies MOSES, cough, or wheezing      Vitals:    12/08/21 1525   BP: 140/68   BP Location: Left arm   Patient Position: Sitting   Pulse: 70   SpO2: 95%   Weight: 81.5 kg (179 lb 9.6 oz)   Height: 171.5 cm (67.5\")     Body mass index is 27.71 kg/m².  PE:  General: NAD  Neck: no JVD, no carotid bruits, no TM  Heart RRR, NL S1, S2,  no rubs, murmurs  Lungs: CTA, no wheezes, rhonchi, or rales  Abd: soft, non-tender, NL BS  Ext: No musculoskeletal deformities, no edema, cyanosis, or clubbing  Psych: normal mood and affect    Diagnostic Data:  BSC ppm manual device interrogation: DDDR rate 70, AP 78%,  <1%, AS/VS @ 59 bpm, <1% AF, longest 2.8 hours November 2020. 5 years on battery.     Procedures    1. AVNRT (AV raphael re-entry tachycardia) (HCC)    2. Typical atrial flutter (HCC)    3. Paroxysmal atrial fibrillation (HCC)    4. Bradycardia        Plan:    1. AVNRT:  - s/p ablation with no recurrences     2. Typical Atrial Flutter:  - s/p ablation with no recurrences     3. PAF:  - less than 1% mode switch on device interrogation, longest 2.8 hours over 1 year ago (November 2020).   CHADSVAsc = 1 on ASA. Pt does not want daily NOAC. Eliquis prn for episodes of atrial fibrillation. Will give samples today to have on hand .       4. " Bradycardia/CHB:  - normal PM function, 5 years on battery.     F/up in 12 months    Electronically signed by SHANNON Kumar, 12/08/21, 3:56 PM EST.

## 2022-01-18 ENCOUNTER — TELEPHONE (OUTPATIENT)
Dept: CARDIOLOGY | Facility: CLINIC | Age: 77
End: 2022-01-18

## 2022-01-18 NOTE — TELEPHONE ENCOUNTER
Called Mr Still in regards to [employer] home monitor isn't reading.  Left message for a return call.

## 2022-01-19 NOTE — TELEPHONE ENCOUNTER
Pt called back to report that his home monitor was unplugged. Pt states he had plugged in his monitor.

## 2022-02-01 PROCEDURE — 93296 REM INTERROG EVL PM/IDS: CPT | Performed by: INTERNAL MEDICINE

## 2022-02-01 PROCEDURE — 93294 REM INTERROG EVL PM/LDLS PM: CPT | Performed by: INTERNAL MEDICINE

## 2022-11-01 PROCEDURE — 93296 REM INTERROG EVL PM/IDS: CPT | Performed by: INTERNAL MEDICINE

## 2022-11-01 PROCEDURE — 93294 REM INTERROG EVL PM/LDLS PM: CPT | Performed by: INTERNAL MEDICINE

## 2023-04-21 ENCOUNTER — TELEPHONE (OUTPATIENT)
Dept: CARDIOLOGY | Facility: CLINIC | Age: 78
End: 2023-04-21
Payer: MEDICARE

## 2023-04-21 NOTE — TELEPHONE ENCOUNTER
Spoke with Mr Still regarding his home monitor not being connected. The power cord is broken. He was in a position to write the number down so we will give it to him on Wednesday at his appointment.

## 2023-04-26 ENCOUNTER — OFFICE VISIT (OUTPATIENT)
Dept: CARDIOLOGY | Facility: CLINIC | Age: 78
End: 2023-04-26
Payer: MEDICARE

## 2023-04-26 VITALS
WEIGHT: 164 LBS | HEART RATE: 70 BPM | DIASTOLIC BLOOD PRESSURE: 60 MMHG | SYSTOLIC BLOOD PRESSURE: 116 MMHG | HEIGHT: 68 IN | OXYGEN SATURATION: 95 % | BODY MASS INDEX: 24.86 KG/M2

## 2023-04-26 DIAGNOSIS — I48.3 TYPICAL ATRIAL FLUTTER: ICD-10-CM

## 2023-04-26 DIAGNOSIS — I44.2 COMPLETE HEART BLOCK: ICD-10-CM

## 2023-04-26 DIAGNOSIS — I48.0 PAROXYSMAL ATRIAL FIBRILLATION: ICD-10-CM

## 2023-04-26 DIAGNOSIS — I47.1 AVNRT (AV NODAL RE-ENTRY TACHYCARDIA): Primary | ICD-10-CM

## 2023-04-26 NOTE — PROGRESS NOTES
Tobias Still  1945  [phone]    04/26/2023    [facility] CARDIOLOGY MAIN CAMPUS     Referring Provider: No ref. provider found     Khushi Adonis Miguelkai, DO  [address] [facility] [address]    Chief Complaint   Patient presents with   • AVNRT       Problem List:      1. Complete heart block/bradycardia:  a. Implantation of a dual-chamber permanent pacemaker (Medtronic), 01/19/2009.  2. Atrial fibrillation/MAT/atrial flutter:  a. Onset in 1997, treated with Tambocor, which was changed to amiodarone, Rythmol, then back to Tambocor therapy.  b. History of Corgard, atenolol, Sectral, verapamil, and Calan use, intolerant secondary to fatigue.  c. Stress echo/echo 1 year ago at [facility], no data.  d. Echocardiogram, 03/23/2010, normal LV size and function.  e. Echocardiogram, October 2014, left ventricular ejection fraction 55% to 60%, mild mitral regurgitation, mild tricuspid regurgitation.   f. Chadsvasc=1  g. EPS 8/13/18: EPS + RFA of AVNRT common type and RFA of RA flutter  3. Shortness of breath:  a. GXT, 06/26/2015: normal LV size and function, no ischemia.  b. Found to have significant anemia with an hematocrit of 27.  c. EGD with subsequent cauterization of an ulcer, database incomplete.  4. Chronic back pain.  5. Surgical history:  a. Left ankle.  b. Left wrist.  c. Right shoulder.  d. Right wrist    Allergies  Allergies   Allergen Reactions   • Demerol [Meperidine] Anaphylaxis       Current Medications    Current Outpatient Medications:   •  aspirin 81 MG EC tablet, Take 1 tablet by mouth Daily., Disp: , Rfl:   •  HYDROcodone-acetaminophen (NORCO)  MG per tablet, Take 1 tablet by mouth Every 8 (Eight) Hours As Needed for Moderate Pain., Disp: , Rfl:   •  pantoprazole (PROTONIX) 40 MG EC tablet, Take 1 tablet by mouth Daily., Disp: , Rfl:   •  tamsulosin (FLOMAX) 0.4 MG capsule 24 hr capsule, Take 1 capsule by mouth Every Night., Disp: , Rfl:  "    History of Present Illness     Pt presents for follow up of AVNRT, AFIB, CHB with PM check. Since we last saw the pt, pt denies any significant AF episodes, SOB, CP, LH, and dizziness, syncope. Denies any hospitalizations, ER visits, bleeding issues on ASA, or TIA/CVA symptoms. Overall feels well. BP is controlled.     ROS:  General:  Denies fatigue, weight gain or loss  Cardiovascular:  Denies CP, PND, syncope, near syncope, edema or palpitations.  Pulmonary:  Denies MOSES, cough, or wheezing      Vitals:    04/26/23 1401   BP: 116/60   BP Location: Right arm   Patient Position: Sitting   Pulse: 70   SpO2: 95%   Weight: 74.4 kg (164 lb)   Height: 171.5 cm (67.5\")     Body mass index is 25.31 kg/m².  PE:  General: NAD  Neck: no JVD, no carotid bruits, no TM  Heart RRR, NL S1, S2, S4 present, no rubs, murmurs  Lungs: CTA, no wheezes, rhonchi, or rales  Abd: soft, non-tender, NL BS  Ext: No musculoskeletal deformities, no edema, cyanosis, or clubbing  Psych: normal mood and affect    Diagnostic Data:    [facility] [place] Interrogation:   RA paced 81%. RV paced less than 1%. Normal thresholds and impedances. 3.5 years on battery. Less than 1% AFIB, total of 6 hours since last check in 2021 (1.5 years ago), longest episode was 3 minutes. 3.5 years on battery.       Procedures      1. AVNRT (AV raphael re-entry tachycardia)    2. Typical atrial flutter    3. Paroxysmal atrial fibrillation    4. Complete heart block          Plan:  1. AVNRT:  - s/p ablation with no recurrences     2. Typical Atrial Flutter:  - s/p ablation with no recurrences     3. PAF:  - overall less than 1% on device interrogation.   CHADSVAsc = 2 on ASA. Pt does not want daily NOAC. Eliquis prn for episodes of atrial fibrillation. Will give samples today to have on hand .       4. Bradycardia/CHB:  - normal PM function, 3.5  years on battery.       F/up in 12 months    Electronically signed by DAGOBERTO Robles, 04/26/23, 2:21 PM EDT.    "

## 2023-09-06 ENCOUNTER — TELEPHONE (OUTPATIENT)
Dept: CARDIOLOGY | Facility: CLINIC | Age: 78
End: 2023-09-06
Payer: MEDICARE

## 2023-09-06 NOTE — TELEPHONE ENCOUNTER
Called patient back regarding a return phone call.       Let patient know that his reading came through when he returned home.

## 2023-09-06 NOTE — TELEPHONE ENCOUNTER
Caller: Tobias Still    Relationship: Self    Best call back number: [phone]     What is the best time to reach you: ANY     Who are you requesting to speak with (clinical staff, provider,  specific staff member): CLINICAL     What was the call regarding: PT STATES THAT HE RECEIVED A CALL FROM THE OFFICE STATING THAT HE SHOULD LET THEM KNOW WHEN HE IS BACK IN [place]OWN TO SEND HIS PACEMAKER READINGS. PT IS BACK IN TOWN.     Is it okay if the provider responds through MyChart: NO

## 2024-05-08 ENCOUNTER — OFFICE VISIT (OUTPATIENT)
Dept: CARDIOLOGY | Facility: CLINIC | Age: 79
End: 2024-05-08
Payer: MEDICARE

## 2024-05-08 VITALS
BODY MASS INDEX: 25.96 KG/M2 | WEIGHT: 165.4 LBS | HEIGHT: 67 IN | OXYGEN SATURATION: 98 % | DIASTOLIC BLOOD PRESSURE: 70 MMHG | SYSTOLIC BLOOD PRESSURE: 122 MMHG | HEART RATE: 84 BPM

## 2024-05-08 DIAGNOSIS — I49.5 SSS (SICK SINUS SYNDROME): ICD-10-CM

## 2024-05-08 DIAGNOSIS — I47.19 AVNRT (AV NODAL RE-ENTRY TACHYCARDIA): Primary | ICD-10-CM

## 2024-05-08 DIAGNOSIS — I48.0 PAROXYSMAL ATRIAL FIBRILLATION: ICD-10-CM

## 2024-05-08 DIAGNOSIS — I48.3 TYPICAL ATRIAL FLUTTER: ICD-10-CM

## 2024-05-08 PROCEDURE — 93280 PM DEVICE PROGR EVAL DUAL: CPT | Performed by: INTERNAL MEDICINE

## 2024-05-08 PROCEDURE — 99213 OFFICE O/P EST LOW 20 MIN: CPT | Performed by: INTERNAL MEDICINE

## 2024-05-08 PROCEDURE — 93000 ELECTROCARDIOGRAM COMPLETE: CPT | Performed by: INTERNAL MEDICINE

## 2024-05-08 NOTE — PROGRESS NOTES
Tobias Still  1945  [phone]      05/08/2024      [facility] CARDIOLOGY     Adonis Puri Miguelkai, DO  [address] [facility] [address]    Chief Complaint   Patient presents with    AVNRT (AV raphael re-entry tachycardia)       Problem List:      Complete heart block/bradycardia:  Implantation of a dual-chamber permanent pacemaker (Medtronic), 01/19/2009.  Atrial fibrillation/MAT/atrial flutter:  Onset in 1997, treated with Tambocor, which was changed to amiodarone, Rythmol, then back to Tambocor therapy.  History of Corgard, atenolol, Sectral, verapamil, and Calan use, intolerant secondary to fatigue.  Stress echo/echo 1 year ago at [facility], no data.  Echocardiogram, 03/23/2010, normal LV size and function.  Echocardiogram, October 2014, left ventricular ejection fraction 55% to 60%, mild mitral regurgitation, mild tricuspid regurgitation.   Chadsvasc=1  EPS 8/13/18: EPS + RFA of AVNRT common type and RFA of RA flutter  Shortness of breath:  GXT, 06/26/2015: normal LV size and function, no ischemia.  Found to have significant anemia with an hematocrit of 27.  EGD with subsequent cauterization of an ulcer, database incomplete.  Chronic back pain.  Surgical history:  Left ankle.  Left wrist.  Right shoulder.  Right wrist      Allergies  Allergies   Allergen Reactions    Demerol [Meperidine] Anaphylaxis       Current Medications    Current Outpatient Medications:     aspirin 81 MG EC tablet, Take 1 tablet by mouth Daily., Disp: , Rfl:     HYDROcodone-acetaminophen (NORCO)  MG per tablet, Take 1 tablet by mouth Every 8 (Eight) Hours As Needed for Moderate Pain., Disp: , Rfl:     pantoprazole (PROTONIX) 40 MG EC tablet, Take 1 tablet by mouth Daily., Disp: , Rfl:     tamsulosin (FLOMAX) 0.4 MG capsule 24 hr capsule, Take 1 capsule by mouth Every Night., Disp: , Rfl:     History of Present Illness     Pt presents for follow up of SSS/AT/AFL. Since the pt has  "seen us in clinic last, pt denies any syncope, SOB, CP, LH, and dizziness. Denies any hospitalizations, ER visits, bleeding, or TIA/CVA symptoms. Overall feels well. BP stable at home.       Vitals:    05/08/24 1540   BP: 122/70   BP Location: Left arm   Patient Position: Sitting   Pulse: 84   SpO2: 98%   Weight: 75 kg (165 lb 6.4 oz)   Height: 170.2 cm (67\")       PE:  General: NAD  Neck: no JVD, no carotid bruits, no TM  Heart: RRR, NL S1, S2, S4 present, no rubs, murmurs  Lungs: CTA, no wheezes, rhonchi, or rales  Abd: soft, non-tender, NL BS  Ext: No musculoskeletal deformities, no edema, cyanosis, or clubbing  Psych: normal mood and affect    Diagnostic Data:    ECG 12 Lead    Date/Time: 5/8/2024 3:46 PM  Performed by: Donn Newell MD    Authorized by: Donn Newell MD  Comparison: compared with previous ECG from 11/28/2018  Similar to previous ECG  Rhythm: sinus rhythm and paced  BPM: 84          BSC PM Manual Interrogation:   RA paced 85%. RV paced less than 1%. Normal thresholds and impedances. 2 years on battery. Less than 1% AFIB,     Advance Care Planning   ACP discussion was held with the patient during this visit. Patient has an advance directive in EMR which is still valid.        1. AVNRT (AV raphael re-entry tachycardia)    2. Typical atrial flutter    3. Paroxysmal atrial fibrillation    4. SSS (sick sinus syndrome)        Plan:  1. AVNRT:  - s/p ablation with no recurrences     2. Typical Atrial Flutter:  - s/p ablation with no recurrences     3. PAF:  - overall less than 1% on device interrogation.   CHADSVAsc = 2 on ASA. Pt does not want daily NOAC. Eliquis prn for episodes of atrial fibrillation. Will give samples today to have on hand .       4. Bradycardia/CHB:  - normal PM function, 3.5  years on battery.       F/up in 12 months      "

## 2024-11-27 PROCEDURE — 93296 REM INTERROG EVL PM/IDS: CPT | Performed by: INTERNAL MEDICINE

## 2024-11-27 PROCEDURE — 93294 REM INTERROG EVL PM/LDLS PM: CPT | Performed by: INTERNAL MEDICINE

## 2025-05-21 ENCOUNTER — OFFICE VISIT (OUTPATIENT)
Dept: CARDIOLOGY | Facility: CLINIC | Age: 80
End: 2025-05-21
Payer: MEDICARE

## 2025-05-21 VITALS
OXYGEN SATURATION: 95 % | WEIGHT: 167.8 LBS | SYSTOLIC BLOOD PRESSURE: 132 MMHG | HEART RATE: 82 BPM | BODY MASS INDEX: 26.34 KG/M2 | HEIGHT: 67 IN | DIASTOLIC BLOOD PRESSURE: 70 MMHG

## 2025-05-21 DIAGNOSIS — I48.3 TYPICAL ATRIAL FLUTTER: ICD-10-CM

## 2025-05-21 DIAGNOSIS — I48.0 PAROXYSMAL ATRIAL FIBRILLATION: ICD-10-CM

## 2025-05-21 DIAGNOSIS — I47.19 AVNRT (AV NODAL RE-ENTRY TACHYCARDIA): Primary | ICD-10-CM

## 2025-05-21 DIAGNOSIS — I44.2 COMPLETE HEART BLOCK: ICD-10-CM

## 2025-05-21 RX ORDER — ESOMEPRAZOLE MAGNESIUM 40 MG/1
40 CAPSULE, DELAYED RELEASE ORAL DAILY
COMMUNITY
Start: 2025-04-17

## 2025-05-21 NOTE — PROGRESS NOTES
Tobias Still  1945  [phone]    05/21/2025    [facility] CARDIOLOGY     Referring Provider: No ref. provider found     Adonis Puri, DO  [address] [facility] [address]    Chief Complaint   Patient presents with    AVNRT (AV raphael re-entry tachycardia)       Problem List:      Complete heart block/bradycardia:  Implantation of a dual-chamber permanent pacemaker (Medtronic), 01/19/2009.  Atrial fibrillation/MAT/atrial flutter:  Onset in 1997, treated with Tambocor, which was changed to amiodarone, Rythmol, then back to Tambocor therapy.  History of Corgard, atenolol, Sectral, verapamil, and Calan use, intolerant secondary to fatigue.  Stress echo/echo 1 year ago at [facility], no data.  Echocardiogram, 03/23/2010, normal LV size and function.  Echocardiogram, October 2014, left ventricular ejection fraction 55% to 60%, mild mitral regurgitation, mild tricuspid regurgitation.   Chadsvasc= 2  EPS 8/13/18: EPS + RFA of AVNRT common type and RFA of RA flutter  Shortness of breath:  GXT, 06/26/2015: normal LV size and function, no ischemia.  Found to have significant anemia with an hematocrit of 27.  EGD with subsequent cauterization of an ulcer, database incomplete.  Chronic back pain.  Surgical history:  Left ankle.  Left wrist.  Right shoulder.  Right wrist    Allergies  Allergies   Allergen Reactions    Demerol [Meperidine] Anaphylaxis       Current Medications    Current Outpatient Medications:     aspirin 81 MG EC tablet, Take 1 tablet by mouth Daily., Disp: , Rfl:     esomeprazole (nexIUM) 40 MG capsule, Take 1 capsule by mouth Daily., Disp: , Rfl:     HYDROcodone-acetaminophen (NORCO)  MG per tablet, Take 1 tablet by mouth Every 8 (Eight) Hours As Needed for Moderate Pain., Disp: , Rfl:     tamsulosin (FLOMAX) 0.4 MG capsule 24 hr capsule, Take 1 capsule by mouth Every Night., Disp: , Rfl:     History of Present Illness:    Pt presents for  "follow up of SSS/AT/AFL, PM check. Since we last saw the pt, pt denies any significant AF episodes, SOB, CP, LH, and dizziness, syncope. Denies any hospitalizations, ER visits, bleeding issues on ASA, or TIA/CVA symptoms. Overall feels well.          Vitals:    05/21/25 1030   BP: 132/70   Pulse: 82   SpO2: 95%   Weight: 76.1 kg (167 lb 12.8 oz)   Height: 170.2 cm (67\")     Body mass index is 26.28 kg/m².  PE:  General: NAD  Neck: no JVD, no carotid bruits, no TM  Heart RRR, NL S1, S2, S4 present, no rubs, murmurs  Lungs: CTA, no wheezes, rhonchi, or rales  Abd: soft, non-tender, NL BS  Ext: No musculoskeletal deformities, no edema, cyanosis, or clubbing  Psych: normal mood and affect    Diagnostic Data:    PM manual interrogation: Normal function RA paced 82% RV paced less than 1%, normal thresholds and impedances, 1 year on battery, less than 1% mode switch longest episode 12 minutes of atrial fibrillation      ECG 12 Lead    Date/Time: 5/21/2025 11:22 AM  Performed by: Jerrica Hernandez PA    Authorized by: Jerrica Hernandez PA  Comparison: compared with previous ECG from 5/8/2024  Similar to previous ECG  Rhythm: sinus rhythm and paced  BPM: 82          Advance Care Planning   ACP discussion was held with the patient during this visit. Patient has an advance directive (not in EMR), copy requested.       1. AVNRT (AV raphael re-entry tachycardia)    2. Typical atrial flutter    3. Paroxysmal atrial fibrillation    4. Complete heart block          Plan:  1. AVNRT:  - s/p ablation with no recurrences     2. Typical Atrial Flutter:  - s/p ablation with no recurrences     3. PAF:  - overall less than 1% on device interrogation.   CHADSVAsc = 2 on ASA. Pt does not want daily NOAC. Eliquis prn for episodes of atrial fibrillation.  He has samples at home     4. Bradycardia/CHB:  - normal PM function, 1 years on battery.  Will start monthly battery checks when he is 6 months on his battery.    F/up in April 2026. "     Electronically signed by DAGOBERTO Robles, 05/21/25, 10:53 AM EDT.

## 2025-06-18 ENCOUNTER — DOCUMENTATION (OUTPATIENT)
Dept: CARDIOLOGY | Facility: CLINIC | Age: 80
End: 2025-06-18
Payer: MEDICARE

## 2025-06-18 NOTE — PROGRESS NOTES
Patient with recent pacemaker download demonstrating 59-minute episodes of atrial fibrillation.  I called the patient today.  He states that he was unaware of the episode.  It appeared to occur approximately 6 AM which he was most likely sleeping.  Long discussion with him again today.  I favor initiation of an oral anticoagulation.  The patient however remains adamant that he would like to stay on aspirin alone.  He understands his risk of stroke.

## 2025-08-27 LAB
MC_CV_MDC_IDC_RATE_1: 160
MC_CV_MDC_IDC_ZONE_ID: 1
MDC_IDC_MSMT_BATTERY_REMAINING_LONGEVITY: 10 MO
MDC_IDC_MSMT_BATTERY_REMAINING_PERCENTAGE: 15 %
MDC_IDC_MSMT_BATTERY_STATUS: NORMAL
MDC_IDC_MSMT_LEADCHNL_RA_DTM: NORMAL
MDC_IDC_MSMT_LEADCHNL_RA_IMPEDANCE_VALUE: 452
MDC_IDC_MSMT_LEADCHNL_RA_PACING_THRESHOLD_POLARITY: NORMAL
MDC_IDC_MSMT_LEADCHNL_RV_DTM: NORMAL
MDC_IDC_MSMT_LEADCHNL_RV_IMPEDANCE_VALUE: 470
MDC_IDC_MSMT_LEADCHNL_RV_PACING_THRESHOLD_POLARITY: NORMAL
MDC_IDC_MSMT_LEADCHNL_RV_SENSING_INTR_AMPL: 11.4
MDC_IDC_PG_IMPLANT_DTM: NORMAL
MDC_IDC_PG_MFG: NORMAL
MDC_IDC_PG_MODEL: NORMAL
MDC_IDC_PG_SERIAL: NORMAL
MDC_IDC_PG_TYPE: NORMAL
MDC_IDC_SESS_DTM: NORMAL
MDC_IDC_SESS_TYPE: NORMAL
MDC_IDC_SET_BRADY_AT_MODE_SWITCH_RATE: 170
MDC_IDC_SET_BRADY_LOWRATE: 70
MDC_IDC_SET_BRADY_MAX_SENSOR_RATE: 130
MDC_IDC_SET_BRADY_MAX_TRACKING_RATE: 130
MDC_IDC_SET_BRADY_MODE: NORMAL
MDC_IDC_SET_BRADY_PAV_DELAY: 220
MDC_IDC_SET_BRADY_SAV_DELAY: 220
MDC_IDC_SET_LEADCHNL_RA_PACING_AMPLITUDE: 2
MDC_IDC_SET_LEADCHNL_RA_PACING_POLARITY: NORMAL
MDC_IDC_SET_LEADCHNL_RA_PACING_PULSEWIDTH: 0.5
MDC_IDC_SET_LEADCHNL_RA_SENSING_POLARITY: NORMAL
MDC_IDC_SET_LEADCHNL_RA_SENSING_SENSITIVITY: 0.75
MDC_IDC_SET_LEADCHNL_RV_PACING_AMPLITUDE: 2
MDC_IDC_SET_LEADCHNL_RV_PACING_POLARITY: NORMAL
MDC_IDC_SET_LEADCHNL_RV_PACING_PULSEWIDTH: 0.5
MDC_IDC_SET_LEADCHNL_RV_SENSING_POLARITY: NORMAL
MDC_IDC_SET_LEADCHNL_RV_SENSING_SENSITIVITY: 2.5
MDC_IDC_SET_ZONE_STATUS: NORMAL
MDC_IDC_SET_ZONE_TYPE: NORMAL
MDC_IDC_STAT_AT_BURDEN_PERCENT: 1
MDC_IDC_STAT_BRADY_RA_PERCENT_PACED: 81
MDC_IDC_STAT_BRADY_RV_PERCENT_PACED: 0

## (undated) DEVICE — Device: Brand: REFERENCE PATCH CARTO 3

## (undated) DEVICE — LEX ELECTRO PHYSIOLOGY: Brand: MEDLINE INDUSTRIES, INC.

## (undated) DEVICE — INTRO SHEATH FASTCATH SRO .038IN 8.5F 63CM

## (undated) DEVICE — ADULT, W/LG. BACK PAD, RADIOTRANSPARENT ELEMENT AND LEAD WIRE: Brand: DEFIBRILLATION ELECTRODES

## (undated) DEVICE — PENCL E/S HNDSWCH ROCKRBTN HOLSTR 10FT

## (undated) DEVICE — CANN NASL CO2 DIVIDED A/

## (undated) DEVICE — Device: Brand: THERMOCOOL SF NAV

## (undated) DEVICE — ST EXT IV SMARTSITE 2VLV SP M LL 5ML IV1

## (undated) DEVICE — DECANT BG O JET

## (undated) DEVICE — MEDI-VAC YANKAUER SUCTION HANDLE W/BULBOUS TIP: Brand: CARDINAL HEALTH

## (undated) DEVICE — DRSNG SURESITE123 4X4.8IN

## (undated) DEVICE — SOL NACL 0.9PCT 1000ML

## (undated) DEVICE — DECANTER: Brand: UNBRANDED

## (undated) DEVICE — INTRO SHEATH ENGAGE W/50 GW .038 7F12

## (undated) DEVICE — SET PRIMARY GRVTY 10DP MALE LL 104IN

## (undated) DEVICE — CAUTERY TIP POLISHER: Brand: DEVON

## (undated) DEVICE — 3M™ STERI-STRIP™ REINFORCED ADHESIVE SKIN CLOSURES, R1547, 1/2 IN X 4 IN (12 MM X 100 MM), 6 STRIPS/ENVELOPE: Brand: 3M™ STERI-STRIP™

## (undated) DEVICE — TUBING, SUCTION, 1/4" X 10', STRAIGHT: Brand: MEDLINE

## (undated) DEVICE — ST INF PRI SMRTSTE 20DRP 2VLV 24ML 117

## (undated) DEVICE — CATH QUAD CRD 6F5MM

## (undated) DEVICE — LIMB HOLDER, WRIST/ANKLE: Brand: DEROYAL

## (undated) DEVICE — Device: Brand: EZ STEER NAV

## (undated) DEVICE — LIMB HOLDERS: Brand: DEROYAL

## (undated) DEVICE — IRRIGATOR BULB ASEPTO 60CC STRL

## (undated) DEVICE — DRSNG SURESITE WNDW 2.38X2.75

## (undated) DEVICE — Device: Brand: WEBSTER

## (undated) DEVICE — Device: Brand: MEDEX

## (undated) DEVICE — Device: Brand: SMARTABLATE